# Patient Record
Sex: FEMALE | Race: WHITE | NOT HISPANIC OR LATINO | Employment: FULL TIME | URBAN - METROPOLITAN AREA
[De-identification: names, ages, dates, MRNs, and addresses within clinical notes are randomized per-mention and may not be internally consistent; named-entity substitution may affect disease eponyms.]

---

## 2017-03-07 ENCOUNTER — HOSPITAL ENCOUNTER (OUTPATIENT)
Dept: OTHER | Facility: HOSPITAL | Age: 32
Discharge: HOME | End: 2017-03-07
Attending: FAMILY MEDICINE | Admitting: FAMILY MEDICINE

## 2021-02-01 DIAGNOSIS — U07.1 COVID-19 VIRUS DETECTED: Primary | ICD-10-CM

## 2021-02-01 RX ORDER — AZITHROMYCIN 250 MG/1
TABLET, FILM COATED ORAL
Qty: 6 TABLET | Refills: 0 | Status: SHIPPED | OUTPATIENT
Start: 2021-02-01 | End: 2022-02-16

## 2021-02-01 RX ORDER — AZITHROMYCIN 250 MG/1
TABLET, FILM COATED ORAL
Qty: 6 TABLET | Refills: 0 | Status: CANCELLED | OUTPATIENT
Start: 2021-02-01

## 2021-02-22 NOTE — PROGRESS NOTES
Subjective   Albina Alvarado is a 35 y.o. female.     Chief Complaint   Patient presents with   • Back Pain       Albina reports of her back pain getting worse when she is about to start her menstrual period.    Back Pain  This is a chronic problem. The current episode started more than 1 year ago. The problem occurs constantly. The problem has been gradually worsening since onset. Pain location: lower left side of back  The quality of the pain is described as stabbing and aching. The pain radiates to the left foot. The pain is at a severity of 3/10. The pain is mild. The pain is the same all the time. Exacerbated by: walking. Stiffness is present: changes with position. Pertinent negatives include no abdominal pain, chest pain, fever, headaches, leg pain, numbness, pelvic pain, tingling or weakness. She has tried NSAIDs for the symptoms. The treatment provided mild relief.            I personally reviewed and updated the patient's allergies, medications, problem list, and past medical, surgical, social, and family history. I have reviewed and confirmed the accuracy of the History of Present Illness and Review of Symptoms as documented by the MA/LPN/RN. Lebron Child MD    History reviewed. No pertinent family history.    Social History     Tobacco Use   • Smoking status: Former Smoker     Packs/day: 0.25     Types: Cigarettes   • Smokeless tobacco: Never Used   • Tobacco comment: few months    Substance Use Topics   • Alcohol use: Yes     Frequency: 2-3 times a week     Drinks per session: 1 or 2     Binge frequency: Never   • Drug use: Never       No past surgical history on file.    Patient Active Problem List   Diagnosis   • Acute bilateral low back pain without sciatica   • Dysmenorrhea         Current Outpatient Medications:   •  azithromycin (ZITHROMAX) 250 MG tablet, Take 2 tablets the first day, then 1 tablet daily for 4 days., Disp: 6 tablet, Rfl: 0  •  busPIRone (BUSPAR) 7.5 MG tablet, Take 1 tablet by  "mouth 3 (Three) Times a Day As Needed (anxiety)., Disp: 60 tablet, Rfl: 5  •  cyclobenzaprine (FLEXERIL) 10 MG tablet, Take 0.5-1 tablets by mouth At Night As Needed for Muscle Spasms., Disp: 30 tablet, Rfl: 5  •  meloxicam (MOBIC) 15 MG tablet, Take 1 tablet by mouth Daily., Disp: 30 tablet, Rfl: 5         Review of Systems   Constitutional: Negative for chills, diaphoresis and fever.   Eyes: Negative for visual disturbance.   Respiratory: Negative for shortness of breath.    Cardiovascular: Negative for chest pain and palpitations.   Gastrointestinal: Negative for abdominal pain and nausea.   Endocrine: Negative for polydipsia and polyphagia.   Genitourinary: Negative for pelvic pain.   Musculoskeletal: Positive for back pain. Negative for neck stiffness.   Skin: Negative for color change and pallor.   Neurological: Negative for tingling, seizures, syncope, weakness and numbness.   Hematological: Negative for adenopathy.   Psychiatric/Behavioral: Negative for hallucinations and suicidal ideas.       I have reviewed and confirmed the accuracy of the ROS as documented by the MA/LPN/RN Lebron Child MD      Objective   /70 (BP Location: Right arm, Patient Position: Sitting, Cuff Size: Adult)   Pulse 93   Temp 97.5 °F (36.4 °C) (Temporal)   Resp 18   Ht 166.4 cm (65.5\")   Wt 62.6 kg (138 lb)   LMP 01/27/2021   SpO2 98% Comment: room air  BMI 22.62 kg/m²   BP Readings from Last 3 Encounters:   02/23/21 120/70   03/09/18 118/75   03/07/17 112/69     Wt Readings from Last 3 Encounters:   02/23/21 62.6 kg (138 lb)   03/09/18 65.2 kg (143 lb 12.8 oz)   03/07/17 62.3 kg (137 lb 4 oz)     Physical Exam  Constitutional:       Appearance: Normal appearance. She is well-developed. She is not diaphoretic.   Cardiovascular:      Rate and Rhythm: Normal rate and regular rhythm.      Pulses: Normal pulses.      Heart sounds: Normal heart sounds, S1 normal and S2 normal. No murmur. No friction rub. No gallop.  "   Pulmonary:      Effort: Pulmonary effort is normal. No accessory muscle usage.      Breath sounds: Normal breath sounds. No stridor. No decreased breath sounds, wheezing, rhonchi or rales.   Abdominal:      General: Bowel sounds are normal. There is no distension.      Palpations: Abdomen is soft. Abdomen is not rigid. There is no mass or pulsatile mass.      Tenderness: There is no abdominal tenderness. There is no guarding or rebound. Negative signs include Morris's sign.      Hernia: No hernia is present.   Musculoskeletal:      Right hip: She exhibits normal range of motion, normal strength, no tenderness, no swelling, no crepitus and no deformity.      Left hip: Normal. She exhibits normal range of motion, normal strength, no tenderness, no swelling, no crepitus and no deformity.      Thoracic back: Normal. She exhibits normal range of motion, no tenderness, no swelling, no edema, no deformity, no laceration and no spasm.      Lumbar back: She exhibits normal range of motion, no tenderness, no swelling, no edema, no deformity and no spasm.   Skin:     General: Skin is warm and dry.      Coloration: Skin is not pale.   Neurological:      Mental Status: She is alert and oriented to person, place, and time.      Sensory: No sensory deficit.      Motor: No atrophy or abnormal muscle tone.      Coordination: Coordination normal.      Gait: Gait normal.      Deep Tendon Reflexes: Reflexes are normal and symmetric.         Data / Lab Results:    No results found for: HGBA1C     No results found for: LDL, LDLDIRECT  No results found for: CHOL  No results found for: TRIG  No results found for: HDL  No results found for: PSA  No results found for: WBC, HGB, HCT, MCV, PLT  No results found for: TSH, V7AALCH, V8GXKON   No results found for: GLUCOSE, BUN, CREATININE, EGFRIFNONA, EGFRIFAFRI, BCR, K, CO2, CALCIUM, PROTENTOTREF, ALBUMIN, LABIL2, BILIRUBIN, AST, ALT  No results found for: PUMA, RF, SEDRATE   No results found  for: CRP   No results found for: IRON, TIBC, FERRITIN   No results found for: [id number]       Assessment/Plan      Medications        Problem List         LOS    Low back pain.  Longstanding with left lower extremity radiculopathy, likely secondary to lumbar disc disease.  Check x-ray/MRI.  Start meloxicam.  Rehabilitation exercises discussed.  Consider anesthesiology referral/epidural injections.  Symptoms worse with menses, DDx includes endometriosis, follow-up for pelvic exam.  Consider further eval if persistent symptoms.  Anxiety.  Worse recently add as needed buspirone.  Sleep improved on melatonin okay to increase dose.  Good social support.  Follow-up recheck.  Follow-up visit for comprehensive physical exam screening test scheduled.      Diagnoses and all orders for this visit:    1. Acute bilateral low back pain without sciatica (Primary)  -     XR Spine Lumbar Complete 4+VW; Future  -     meloxicam (MOBIC) 15 MG tablet; Take 1 tablet by mouth Daily.  Dispense: 30 tablet; Refill: 5  -     MRI Lumbar Spine Without Contrast; Future    2. Acute reaction to stress  -     busPIRone (BUSPAR) 7.5 MG tablet; Take 1 tablet by mouth 3 (Three) Times a Day As Needed (anxiety).  Dispense: 60 tablet; Refill: 5    3. Abnormal x-ray of lumbar spine  -     MRI Lumbar Spine Without Contrast; Future    4. Dysmenorrhea              Expected course, medications, and adverse effects discussed.  Call or return if worsening or persistent symptoms.  I wore protective equipment throughout this patient encounter including a mask, gloves, and eye protection.  Hand hygiene was performed before donning protective equipment and after removal when leaving the room. The complete contents of the Assessment and Plan and Data/Lab Results as documented above have been reviewed and addressed by myself with the patient today as part of an ongoing evaluation / treatment plan.  If some of the documentation has been copied from a previous note  and is unchanged it indicates that this problem / plan has been assessed today but is stable from a previous visit and no changes have been recommended.

## 2021-02-23 ENCOUNTER — HOSPITAL ENCOUNTER (OUTPATIENT)
Dept: GENERAL RADIOLOGY | Facility: HOSPITAL | Age: 36
Discharge: HOME | End: 2021-02-23
Admitting: FAMILY MEDICINE

## 2021-02-23 ENCOUNTER — TELEPHONE (OUTPATIENT)
Dept: FAMILY MEDICINE CLINIC | Facility: CLINIC | Age: 36
End: 2021-02-23

## 2021-02-23 ENCOUNTER — OFFICE VISIT (OUTPATIENT)
Dept: FAMILY MEDICINE CLINIC | Facility: CLINIC | Age: 36
End: 2021-02-23

## 2021-02-23 VITALS
HEIGHT: 66 IN | BODY MASS INDEX: 22.18 KG/M2 | RESPIRATION RATE: 18 BRPM | HEART RATE: 93 BPM | WEIGHT: 138 LBS | DIASTOLIC BLOOD PRESSURE: 70 MMHG | TEMPERATURE: 97.5 F | OXYGEN SATURATION: 98 % | SYSTOLIC BLOOD PRESSURE: 120 MMHG

## 2021-02-23 DIAGNOSIS — F43.0 ACUTE REACTION TO STRESS: ICD-10-CM

## 2021-02-23 DIAGNOSIS — M54.50 ACUTE BILATERAL LOW BACK PAIN WITHOUT SCIATICA: Primary | ICD-10-CM

## 2021-02-23 DIAGNOSIS — R93.7 ABNORMAL X-RAY OF LUMBAR SPINE: ICD-10-CM

## 2021-02-23 DIAGNOSIS — M54.50 ACUTE BILATERAL LOW BACK PAIN WITHOUT SCIATICA: ICD-10-CM

## 2021-02-23 DIAGNOSIS — N94.6 DYSMENORRHEA: ICD-10-CM

## 2021-02-23 PROCEDURE — 72110 X-RAY EXAM L-2 SPINE 4/>VWS: CPT

## 2021-02-23 PROCEDURE — 99214 OFFICE O/P EST MOD 30 MIN: CPT | Performed by: FAMILY MEDICINE

## 2021-02-23 RX ORDER — CYCLOBENZAPRINE HCL 10 MG
5-10 TABLET ORAL NIGHTLY PRN
Qty: 30 TABLET | Refills: 5 | Status: SHIPPED | OUTPATIENT
Start: 2021-02-23 | End: 2022-02-16

## 2021-02-23 RX ORDER — BUSPIRONE HYDROCHLORIDE 7.5 MG/1
7.5 TABLET ORAL 3 TIMES DAILY PRN
Qty: 60 TABLET | Refills: 5 | Status: SHIPPED | OUTPATIENT
Start: 2021-02-23 | End: 2022-03-15

## 2021-02-23 RX ORDER — MELOXICAM 15 MG/1
15 TABLET ORAL DAILY
Qty: 30 TABLET | Refills: 5 | Status: SHIPPED | OUTPATIENT
Start: 2021-02-23 | End: 2021-09-27

## 2021-02-23 NOTE — TELEPHONE ENCOUNTER
----- Message from Lebron Child MD sent at 2/23/2021 10:55 AM EST -----  Let her know she does have a significant narrowing in the vertebral space between the L5 and S1 vertebra in the moderate range as expected, she may have a bulging disc in that area, go ahead and set up for an MRI of her L-spine without contrast, thanks

## 2021-02-25 PROBLEM — N94.6 DYSMENORRHEA: Status: ACTIVE | Noted: 2021-02-25

## 2021-02-26 ENCOUNTER — TELEPHONE (OUTPATIENT)
Dept: FAMILY MEDICINE CLINIC | Facility: CLINIC | Age: 36
End: 2021-02-26

## 2021-02-26 DIAGNOSIS — M54.50 ACUTE BILATERAL LOW BACK PAIN WITHOUT SCIATICA: Primary | ICD-10-CM

## 2021-02-26 NOTE — TELEPHONE ENCOUNTER
Spoke with Albina,  Explained that medicaid did not approve MRI of back , they are requesting physical therapy , and follow appointment with Dr Child after completing. We have sent order to  physical therapy and they will call you and arrange the appointment.

## 2021-05-19 ENCOUNTER — TELEPHONE (OUTPATIENT)
Dept: FAMILY MEDICINE CLINIC | Facility: CLINIC | Age: 36
End: 2021-05-19

## 2021-05-19 NOTE — TELEPHONE ENCOUNTER
----- Message from Lebron Child MD sent at 5/19/2021  8:50 AM EDT -----  Let her know her blood work looks good, blood count, kidney, liver function, cholesterol, everything normal, thanks

## 2021-09-15 ENCOUNTER — TELEPHONE (OUTPATIENT)
Dept: FAMILY MEDICINE CLINIC | Facility: CLINIC | Age: 36
End: 2021-09-15

## 2021-09-15 NOTE — TELEPHONE ENCOUNTER
Caller: Jenny Albina    Relationship: Self    Best call back number: [phone]    What is the medical concern/diagnosis: PAIN IN RIGHT LOWER ABDOMINAL AREA.    What specialty or service is being requested: ULTRA SOUND OR SCAN OR AN APPOINTMENT    What is the provider, practice or medical service name: [facility]    What is the office location: OFFICE    Any additional details: PATIENT IS HAVING SEVERE LOWER ABDOMINAL PAIN ON HER RIGHT SIDE.  THIS HAS BEEN GOING ON FOR 4 DAYS NOW DURING HER MENSTRUAL CYCLE.

## 2021-09-26 DIAGNOSIS — M54.50 ACUTE BILATERAL LOW BACK PAIN WITHOUT SCIATICA: ICD-10-CM

## 2021-09-27 RX ORDER — MELOXICAM 15 MG/1
TABLET ORAL
Qty: 30 TABLET | Refills: 0 | Status: SHIPPED | OUTPATIENT
Start: 2021-09-27 | End: 2021-12-10

## 2021-12-09 DIAGNOSIS — M54.50 ACUTE BILATERAL LOW BACK PAIN WITHOUT SCIATICA: ICD-10-CM

## 2021-12-10 RX ORDER — MELOXICAM 15 MG/1
TABLET ORAL
Qty: 30 TABLET | Refills: 0 | Status: SHIPPED | OUTPATIENT
Start: 2021-12-10 | End: 2022-02-22

## 2022-01-03 ENCOUNTER — TELEPHONE (OUTPATIENT)
Dept: FAMILY MEDICINE CLINIC | Facility: CLINIC | Age: 37
End: 2022-01-03

## 2022-01-03 NOTE — TELEPHONE ENCOUNTER
Patient called with soa, fatigue, and a home postive on 1/3. I advised urgent care or ER due to SOA. She declined and asked I send a message for further instruction.

## 2022-01-03 NOTE — TELEPHONE ENCOUNTER
LM with patient that Dr Child is out of office this week. However I am happy to talk with her if she needs further instruction. If has chest pain or excessive SOB advised to go to nearest ER

## 2022-02-16 ENCOUNTER — OFFICE VISIT (OUTPATIENT)
Dept: FAMILY MEDICINE CLINIC | Facility: CLINIC | Age: 37
End: 2022-02-16

## 2022-02-16 VITALS
HEIGHT: 66 IN | HEART RATE: 63 BPM | OXYGEN SATURATION: 98 % | RESPIRATION RATE: 16 BRPM | WEIGHT: 132.8 LBS | DIASTOLIC BLOOD PRESSURE: 60 MMHG | TEMPERATURE: 97.3 F | SYSTOLIC BLOOD PRESSURE: 102 MMHG | BODY MASS INDEX: 21.34 KG/M2

## 2022-02-16 DIAGNOSIS — F43.0 ACUTE REACTION TO STRESS: Primary | ICD-10-CM

## 2022-02-16 DIAGNOSIS — R06.2 WHEEZING: ICD-10-CM

## 2022-02-16 DIAGNOSIS — Z87.891 HISTORY OF TOBACCO USE: ICD-10-CM

## 2022-02-16 DIAGNOSIS — R05.9 COUGH: ICD-10-CM

## 2022-02-16 PROCEDURE — 99214 OFFICE O/P EST MOD 30 MIN: CPT | Performed by: FAMILY MEDICINE

## 2022-02-16 RX ORDER — ESCITALOPRAM OXALATE 5 MG/1
5 TABLET ORAL DAILY
Qty: 30 TABLET | Refills: 2 | Status: SHIPPED | OUTPATIENT
Start: 2022-02-16 | End: 2022-05-18

## 2022-02-16 RX ORDER — AZITHROMYCIN 250 MG/1
TABLET, FILM COATED ORAL
Qty: 6 TABLET | Refills: 0 | Status: SHIPPED | OUTPATIENT
Start: 2022-02-16 | End: 2022-03-15

## 2022-02-16 NOTE — PROGRESS NOTES
Subjective   Albina Alvarado is a 36 y.o. female.     Chief Complaint   Patient presents with   • Anxiety   • Wheezing       Albina says the buspirone has helped some. She is taking half a tablet on the day she feels she needs it but has noticed she feels fatigued the next day. She states she can not take a whole tablet or she feels nauseas.    Albina also states since having covid-19 the beginning of 2022 she still has shortness of breath and wheezing. She will feel at times like her throat is stiff or tense and she wheezes. Her  currently has covid-19. She does have a headache but said she should not have it since she just had covid 4 weeks ago.    Anxiety  Presents for follow-up visit. Symptoms include decreased concentration, excessive worry, irritability, malaise, nervous/anxious behavior and obsessions. Patient reports no chest pain, nausea, palpitations, shortness of breath or suicidal ideas. The severity of symptoms is moderate. The patient sleeps 7 hours per night. Nighttime awakenings: several.                I personally reviewed and updated the patient's allergies, medications, problem list, and past medical, surgical, social, and family history. I have reviewed and confirmed the accuracy of the History of Present Illness and Review of Symptoms as documented by the MA/LPN/RN. Lebron Child MD    History reviewed. No pertinent family history.    Social History     Tobacco Use   • Smoking status: Former Smoker     Packs/day: 0.25     Years: 1.00     Pack years: 0.25     Types: Cigarettes     Start date: 2004     Quit date: 2005     Years since quittin.1   • Smokeless tobacco: Never Used   Vaping Use   • Vaping Use: Never used   Substance Use Topics   • Alcohol use: Yes   • Drug use: Never       History reviewed. No pertinent surgical history.    Patient Active Problem List   Diagnosis   • Acute bilateral low back pain without sciatica   • Dysmenorrhea   • History of tobacco use  "  • COVID-19 virus detected         Current Outpatient Medications:   •  busPIRone (BUSPAR) 7.5 MG tablet, Take 1 tablet by mouth 3 (Three) Times a Day As Needed (anxiety)., Disp: 60 tablet, Rfl: 5  •  meloxicam (MOBIC) 15 MG tablet, Take 1 tablet by mouth once daily, Disp: 30 tablet, Rfl: 0  •  azithromycin (ZITHROMAX) 250 MG tablet, Take 2 tablets the first day, then 1 tablet daily for 4 days., Disp: 6 tablet, Rfl: 0  •  escitalopram (Lexapro) 5 MG tablet, Take 1 tablet by mouth Daily., Disp: 30 tablet, Rfl: 2         Review of Systems   Constitutional: Positive for irritability. Negative for chills and diaphoresis.   Eyes: Negative for visual disturbance.   Respiratory: Negative for shortness of breath.    Cardiovascular: Negative for chest pain and palpitations.   Gastrointestinal: Negative for abdominal pain and nausea.   Endocrine: Negative for polydipsia and polyphagia.   Musculoskeletal: Negative for neck stiffness.   Skin: Negative for color change and pallor.   Neurological: Negative for seizures and syncope.   Hematological: Negative for adenopathy.   Psychiatric/Behavioral: Positive for decreased concentration. Negative for hallucinations and suicidal ideas. The patient is nervous/anxious.        I have reviewed and confirmed the accuracy of the ROS as documented by the MA/LPN/RN Lebron Child MD      Objective   /60   Pulse 63   Temp 97.3 °F (36.3 °C)   Resp 16   Ht 166.4 cm (65.5\")   Wt 60.2 kg (132 lb 12.8 oz)   LMP 02/02/2022   SpO2 98%   Breastfeeding No   BMI 21.76 kg/m²   BP Readings from Last 3 Encounters:   02/16/22 102/60   02/23/21 120/70   03/09/18 118/75     Wt Readings from Last 3 Encounters:   02/16/22 60.2 kg (132 lb 12.8 oz)   02/23/21 62.6 kg (138 lb)   03/09/18 65.2 kg (143 lb 12.8 oz)     Physical Exam  Constitutional:       Appearance: Normal appearance. She is well-developed. She is not diaphoretic.   Cardiovascular:      Rate and Rhythm: Normal rate and regular " rhythm.      Pulses: Normal pulses.      Heart sounds: Normal heart sounds, S1 normal and S2 normal. No murmur heard.  No friction rub. No gallop.    Pulmonary:      Effort: Pulmonary effort is normal. No accessory muscle usage.      Breath sounds: Normal breath sounds. No stridor. No decreased breath sounds, wheezing, rhonchi or rales.   Abdominal:      General: Bowel sounds are normal. There is no distension.      Palpations: Abdomen is soft. Abdomen is not rigid. There is no mass or pulsatile mass.      Tenderness: There is no abdominal tenderness. There is no guarding or rebound. Negative signs include Morris's sign.      Hernia: No hernia is present.   Skin:     General: Skin is warm and dry.      Coloration: Skin is not pale.   Neurological:      Mental Status: She is alert and oriented to person, place, and time.      Coordination: Coordination normal.      Gait: Gait normal.         Data / Lab Results:    No results found for: HGBA1C     Lab Results   Component Value Date    LDL 98 05/18/2021     No results found for: CHOL  Lab Results   Component Value Date    TRIG 39 05/18/2021     Lab Results   Component Value Date    HDL 63 05/18/2021     No results found for: PSA  Lab Results   Component Value Date    WBC 5.5 05/18/2021    HGB 12.6 05/18/2021    HCT 37.3 05/18/2021    MCV 92 05/18/2021     05/18/2021     Lab Results   Component Value Date    TSH 0.690 05/18/2021      Lab Results   Component Value Date    GLUCOSE 86 05/18/2021    BUN 10 05/18/2021    CREATININE 0.66 05/18/2021    EGFRIFNONA 116 05/18/2021    EGFRIFAFRI 133 05/18/2021    BCR 15 05/18/2021    K 4.2 05/18/2021    CO2 24 05/18/2021    CALCIUM 9.4 05/18/2021    PROTENTOTREF 6.9 05/18/2021    ALBUMIN 4.4 05/18/2021    LABIL2 1.8 05/18/2021    AST 18 05/18/2021    ALT 13 05/18/2021     No results found for: PUMA, RF, SEDRATE   No results found for: CRP   No results found for: IRON, TIBC, FERRITIN   No results found for: [id number]        Assessment/Plan      Medications        Problem List         LOS      Low back pain.  Longstanding with left lower extremity radiculopathy, likely secondary to lumbar disc disease.  Check x-ray/MRI.  Start meloxicam.  Rehabilitation exercises discussed.  Consider anesthesiology referral/epidural injections.  Symptoms worse with menses, DDx includes endometriosis, follow-up for pelvic exam.  Consider further eval if persistent symptoms.  Anxiety.   About the same today, not tolerating buspirone, start Lexapro. Longstanding. Sleep improved on melatonin okay to increase dose.  Good social support.  Follow-up recheck.  COVID-19 viral upper respiratory infection. Overall clinically improved today. Persistent sinus effusion, lymphadenopathy start Z-Bill. Has been vaccinated. Call return if respiratory difficulty worsening symptoms. Has completed quarantine.  Follow-up visit for comprehensive physical exam screening test planned.        Diagnoses and all orders for this visit:    1. Acute reaction to stress (Primary)  -     escitalopram (Lexapro) 5 MG tablet; Take 1 tablet by mouth Daily.  Dispense: 30 tablet; Refill: 2    2. Wheezing    3. History of tobacco use    4. Cough  -     azithromycin (ZITHROMAX) 250 MG tablet; Take 2 tablets the first day, then 1 tablet daily for 4 days.  Dispense: 6 tablet; Refill: 0              Expected course, medications, and adverse effects discussed.  Call or return if worsening or persistent symptoms.  I wore protective equipment throughout this patient encounter including a mask, gloves, and eye protection.  Hand hygiene was performed before donning protective equipment and after removal when leaving the room. The complete contents of the Assessment and Plan and Data/Lab Results as documented above have been reviewed and addressed by myself with the patient today as part of an ongoing evaluation / treatment plan.  If some of the documentation has been copied from a previous note  and is unchanged it indicates that this problem / plan has been assessed today but is stable from a previous visit and no changes have been recommended.

## 2022-02-17 PROBLEM — U07.1 COVID-19 VIRUS DETECTED: Status: ACTIVE | Noted: 2022-02-17

## 2022-02-22 DIAGNOSIS — M54.50 ACUTE BILATERAL LOW BACK PAIN WITHOUT SCIATICA: ICD-10-CM

## 2022-02-22 RX ORDER — MELOXICAM 15 MG/1
TABLET ORAL
Qty: 30 TABLET | Refills: 0 | Status: SHIPPED | OUTPATIENT
Start: 2022-02-22 | End: 2022-03-14

## 2022-03-14 DIAGNOSIS — M54.50 ACUTE BILATERAL LOW BACK PAIN WITHOUT SCIATICA: ICD-10-CM

## 2022-03-14 RX ORDER — MELOXICAM 15 MG/1
TABLET ORAL
Qty: 30 TABLET | Refills: 0 | Status: SHIPPED | OUTPATIENT
Start: 2022-03-14 | End: 2022-03-15 | Stop reason: SDUPTHER

## 2022-03-15 ENCOUNTER — HOSPITAL ENCOUNTER (OUTPATIENT)
Dept: GENERAL RADIOLOGY | Facility: HOSPITAL | Age: 37
Discharge: HOME | End: 2022-03-15
Admitting: FAMILY MEDICINE

## 2022-03-15 ENCOUNTER — OFFICE VISIT (OUTPATIENT)
Dept: FAMILY MEDICINE CLINIC | Facility: CLINIC | Age: 37
End: 2022-03-15

## 2022-03-15 VITALS
RESPIRATION RATE: 18 BRPM | HEART RATE: 76 BPM | SYSTOLIC BLOOD PRESSURE: 112 MMHG | BODY MASS INDEX: 21.05 KG/M2 | WEIGHT: 131 LBS | DIASTOLIC BLOOD PRESSURE: 80 MMHG | OXYGEN SATURATION: 99 % | TEMPERATURE: 97.1 F | HEIGHT: 66 IN

## 2022-03-15 DIAGNOSIS — Z87.891 HISTORY OF TOBACCO USE: ICD-10-CM

## 2022-03-15 DIAGNOSIS — F41.9 ANXIETY: Primary | ICD-10-CM

## 2022-03-15 DIAGNOSIS — R05.9 COUGH: ICD-10-CM

## 2022-03-15 DIAGNOSIS — U07.1 COVID-19 VIRUS DETECTED: ICD-10-CM

## 2022-03-15 DIAGNOSIS — M54.50 ACUTE BILATERAL LOW BACK PAIN WITHOUT SCIATICA: ICD-10-CM

## 2022-03-15 DIAGNOSIS — R06.02 SHORTNESS OF BREATH: ICD-10-CM

## 2022-03-15 PROCEDURE — 71046 X-RAY EXAM CHEST 2 VIEWS: CPT

## 2022-03-15 PROCEDURE — 99214 OFFICE O/P EST MOD 30 MIN: CPT | Performed by: FAMILY MEDICINE

## 2022-03-15 RX ORDER — MELOXICAM 15 MG/1
15 TABLET ORAL DAILY
Qty: 90 TABLET | Refills: 3 | Status: SHIPPED | OUTPATIENT
Start: 2022-03-15

## 2022-03-15 RX ORDER — PREDNISONE 1 MG/1
TABLET ORAL
Qty: 45 TABLET | Refills: 0 | Status: SHIPPED | OUTPATIENT
Start: 2022-03-15 | End: 2022-05-17

## 2022-03-15 RX ORDER — ALBUTEROL SULFATE 90 UG/1
2 AEROSOL, METERED RESPIRATORY (INHALATION) EVERY 4 HOURS PRN
Qty: 18 G | Refills: 2 | Status: SHIPPED | OUTPATIENT
Start: 2022-03-15 | End: 2023-01-13 | Stop reason: SDUPTHER

## 2022-03-15 NOTE — PROGRESS NOTES
Subjective   Albina Alvarado is a 36 y.o. female.     Chief Complaint   Patient presents with   • Anxiety   • Shortness of Breath       Albina was started on Lexapro on 22    Anxiety  Presents for follow-up visit. Symptoms include decreased concentration, excessive worry, irritability, malaise, nervous/anxious behavior, obsessions and shortness of breath. Patient reports no chest pain, nausea, palpitations or suicidal ideas. The severity of symptoms is moderate. The patient sleeps 7 hours per night. Nighttime awakenings: several.       Shortness of Breath  This is a recurrent problem. The current episode started more than 1 month ago. Pertinent negatives include no abdominal pain or chest pain. The patient has no known risk factors (has IUD ) for DVT/PE.            I personally reviewed and updated the patient's allergies, medications, problem list, and past medical, surgical, social, and family history. I have reviewed and confirmed the accuracy of the History of Present Illness and Review of Symptoms as documented by the MA/LPN/RN. Lebron Child MD    History reviewed. No pertinent family history.    Social History     Tobacco Use   • Smoking status: Former Smoker     Packs/day: 0.25     Years: 1.00     Pack years: 0.25     Types: Cigarettes     Start date: 2004     Quit date: 2005     Years since quittin.2   • Smokeless tobacco: Never Used   Vaping Use   • Vaping Use: Never used   Substance Use Topics   • Alcohol use: Yes   • Drug use: Never       No past surgical history on file.    Patient Active Problem List   Diagnosis   • Acute bilateral low back pain without sciatica   • Dysmenorrhea   • History of tobacco use   • COVID-19 virus detected   • Anxiety         Current Outpatient Medications:   •  escitalopram (Lexapro) 5 MG tablet, Take 1 tablet by mouth Daily., Disp: 30 tablet, Rfl: 2  •  levonorgestrel (MIRENA) 20 MCG/24HR IUD, 1 each by Intrauterine route 1 (One) Time., Disp: , Rfl:   •   "meloxicam (MOBIC) 15 MG tablet, Take 1 tablet by mouth Daily., Disp: 90 tablet, Rfl: 3  •  albuterol sulfate  (90 Base) MCG/ACT inhaler, Inhale 2 puffs Every 4 (Four) Hours As Needed for Wheezing., Disp: 18 g, Rfl: 2  •  predniSONE (DELTASONE) 5 MG tablet, 40mg x 3 days, 20mg x 3 days, 10mg x 3 days, 5mg x 3 days, Disp: 45 tablet, Rfl: 0         Review of Systems   Constitutional: Positive for irritability. Negative for chills and diaphoresis.   Eyes: Negative for visual disturbance.   Respiratory: Positive for shortness of breath.    Cardiovascular: Negative for chest pain and palpitations.   Gastrointestinal: Negative for abdominal pain and nausea.   Endocrine: Negative for polydipsia and polyphagia.   Musculoskeletal: Negative for neck stiffness.   Skin: Negative for color change and pallor.   Neurological: Negative for seizures and syncope.   Hematological: Negative for adenopathy.   Psychiatric/Behavioral: Positive for decreased concentration. Negative for hallucinations and suicidal ideas. The patient is nervous/anxious.        I have reviewed and confirmed the accuracy of the ROS as documented by the MA/LPN/RN Lebron Child MD      Objective   /80   Pulse 76   Temp 97.1 °F (36.2 °C)   Resp 18   Ht 166.4 cm (65.5\")   Wt 59.4 kg (131 lb)   SpO2 99%   Breastfeeding No   BMI 21.47 kg/m²   BP Readings from Last 3 Encounters:   03/15/22 112/80   02/16/22 102/60   02/23/21 120/70     Wt Readings from Last 3 Encounters:   03/15/22 59.4 kg (131 lb)   02/16/22 60.2 kg (132 lb 12.8 oz)   02/23/21 62.6 kg (138 lb)     Physical Exam  Constitutional:       Appearance: Normal appearance. She is well-developed. She is not diaphoretic.   HENT:      Head: Normocephalic.      Right Ear: Hearing, ear canal and external ear normal. A middle ear effusion is present. Tympanic membrane is erythematous.      Left Ear: Hearing, ear canal and external ear normal. A middle ear effusion is present. Tympanic membrane " is erythematous.      Nose: Congestion present.      Right Sinus: Maxillary sinus tenderness and frontal sinus tenderness present.      Left Sinus: Maxillary sinus tenderness and frontal sinus tenderness present.      Mouth/Throat:      Pharynx: Posterior oropharyngeal erythema present.      Tonsils: No tonsillar abscesses. 1+ on the right. 1+ on the left.   Eyes:      General: Lids are normal.      Conjunctiva/sclera: Conjunctivae normal.      Pupils: Pupils are equal, round, and reactive to light.   Neck:      Meningeal: Brudzinski's sign and Kernig's sign absent.   Cardiovascular:      Rate and Rhythm: Normal rate and regular rhythm.      Pulses: Normal pulses.      Heart sounds: Normal heart sounds, S1 normal and S2 normal. No murmur heard.    No friction rub. No gallop.   Pulmonary:      Effort: Pulmonary effort is normal. No accessory muscle usage or respiratory distress.      Breath sounds: No stridor. Examination of the right-upper field reveals wheezing and rhonchi. Examination of the left-upper field reveals wheezing and rhonchi. Examination of the right-middle field reveals wheezing and rhonchi. Examination of the left-middle field reveals wheezing and rhonchi. Examination of the right-lower field reveals wheezing and rhonchi. Examination of the left-lower field reveals wheezing and rhonchi. Wheezing and rhonchi present. No decreased breath sounds or rales.   Abdominal:      General: Bowel sounds are normal. There is no distension.      Palpations: Abdomen is soft. There is no mass.      Tenderness: There is no abdominal tenderness.      Hernia: No hernia is present.   Skin:     General: Skin is warm and dry.      Coloration: Skin is not pale.   Neurological:      Mental Status: She is alert and oriented to person, place, and time.      Cranial Nerves: No cranial nerve deficit.      Coordination: Coordination normal.      Gait: Gait normal.         Data / Lab Results:    No results found for: HGBA1C      Lab Results   Component Value Date    LDL 98 05/18/2021     No results found for: CHOL  Lab Results   Component Value Date    TRIG 39 05/18/2021     Lab Results   Component Value Date    HDL 63 05/18/2021     No results found for: PSA  Lab Results   Component Value Date    WBC 5.5 05/18/2021    HGB 12.6 05/18/2021    HCT 37.3 05/18/2021    MCV 92 05/18/2021     05/18/2021     Lab Results   Component Value Date    TSH 0.690 05/18/2021      Lab Results   Component Value Date    GLUCOSE 86 05/18/2021    BUN 10 05/18/2021    CREATININE 0.66 05/18/2021    EGFRIFNONA 116 05/18/2021    EGFRIFAFRI 133 05/18/2021    BCR 15 05/18/2021    K 4.2 05/18/2021    CO2 24 05/18/2021    CALCIUM 9.4 05/18/2021    PROTENTOTREF 6.9 05/18/2021    ALBUMIN 4.4 05/18/2021    LABIL2 1.8 05/18/2021    AST 18 05/18/2021    ALT 13 05/18/2021     No results found for: PUMA, RF, SEDRATE   No results found for: CRP   No results found for: IRON, TIBC, FERRITIN   No results found for: [id number]       Assessment/Plan      Medications        Problem List         LOS      Low back pain.  Longstanding with left lower extremity radiculopathy, likely secondary to lumbar disc disease.  Check x-ray/MRI.  Start meloxicam.  Rehabilitation exercises discussed.  Consider anesthesiology referral/epidural injections.  Symptoms worse with menses, DDx includes endometriosis, follow-up for pelvic exam.  Consider further eval if persistent symptoms.  Anxiety.   Much improved today on Lexapro 5 mg daily.. Longstanding. Sleep improved on melatonin okay to increase dose.  Good social support.  Follow-up recheck.  COVID-19 viral upper respiratory infection. Overall clinically improved today.  Persistent wheezing/bronchoconstriction today, chest x-ray benign today, has completed course of Z-Bill, start prednisone/rescue inhaler.. Has been vaccinated. Call return if respiratory difficulty worsening symptoms. Has completed quarantine.  Follow-up visit for  comprehensive physical exam screening test  scheduled.        Diagnoses and all orders for this visit:    1. Anxiety (Primary)    2. History of tobacco use    3. Shortness of breath  -     XR Chest PA & Lateral  -     predniSONE (DELTASONE) 5 MG tablet; 40mg x 3 days, 20mg x 3 days, 10mg x 3 days, 5mg x 3 days  Dispense: 45 tablet; Refill: 0  -     albuterol sulfate  (90 Base) MCG/ACT inhaler; Inhale 2 puffs Every 4 (Four) Hours As Needed for Wheezing.  Dispense: 18 g; Refill: 2    4. Cough  -     XR Chest PA & Lateral  -     predniSONE (DELTASONE) 5 MG tablet; 40mg x 3 days, 20mg x 3 days, 10mg x 3 days, 5mg x 3 days  Dispense: 45 tablet; Refill: 0  -     albuterol sulfate  (90 Base) MCG/ACT inhaler; Inhale 2 puffs Every 4 (Four) Hours As Needed for Wheezing.  Dispense: 18 g; Refill: 2    5. Acute bilateral low back pain without sciatica  -     meloxicam (MOBIC) 15 MG tablet; Take 1 tablet by mouth Daily.  Dispense: 90 tablet; Refill: 3    6. COVID-19 virus detected              Expected course, medications, and adverse effects discussed.  Call or return if worsening or persistent symptoms.  I wore protective equipment throughout this patient encounter including a mask, gloves, and eye protection.  Hand hygiene was performed before donning protective equipment and after removal when leaving the room. The complete contents of the Assessment and Plan and Data/Lab Results as documented above have been reviewed and addressed by myself with the patient today as part of an ongoing evaluation / treatment plan.  If some of the documentation has been copied from a previous note and is unchanged it indicates that this problem / plan has been assessed today but is stable from a previous visit and no changes have been recommended.

## 2022-03-18 ENCOUNTER — TELEPHONE (OUTPATIENT)
Dept: FAMILY MEDICINE CLINIC | Facility: CLINIC | Age: 37
End: 2022-03-18

## 2022-05-16 NOTE — PROGRESS NOTES
Subjective   Albina Alvarado is a 36 y.o. female.     Chief Complaint   Patient presents with   • Annual Exam   • Anxiety       The patient is here: for coordination of medical care to discuss health maintenance and disease prevention to follow up on multiple medical problems.  Last comprehensive physical was on 3/26/2016.  Previous physical was performed by  Lebron Child MD  Overall has: moderate activity with work/home activities, good appetite, feels well with minor complaints, good energy level and is sleeping well. Nutrition: eating a variety of foods. Last tetanus shot was 2014. Patient is doing routine self breast exams: monthly Albina reports that her last pap was 2021 at [facility]     History of Present Illness     Recent Hospitalizations:  No hospitalization(s) within the last year..  ccc      I personally reviewed and updated the patient's allergies, medications, problem list, and past medical, surgical, social, and family history. I have reviewed and confirmed the accuracy of the HPI and ROS as documented by the MA/LPN/RN Lebron Child MD    Family History   Problem Relation Age of Onset   • Hypertension Mother    • COPD Mother    • Ulcerative colitis Mother    • Atrial fibrillation Father    • No Known Problems Brother        Social History     Tobacco Use   • Smoking status: Former Smoker     Packs/day: 0.25     Years: 1.00     Pack years: 0.25     Types: Cigarettes     Start date: 2004     Quit date: 2005     Years since quittin.3   • Smokeless tobacco: Never Used   Vaping Use   • Vaping Use: Never used   Substance Use Topics   • Alcohol use: Yes   • Drug use: Never       Past Surgical History:   Procedure Laterality Date   • BREAST AUGMENTATION Bilateral        Patient Active Problem List   Diagnosis   • Acute bilateral low back pain without sciatica   • Dysmenorrhea   • History of tobacco use   • COVID-19 virus detected   • Anxiety   • Annual visit for  "general adult medical examination with abnormal findings   • Screening for malignant neoplasm of cervix         Current Outpatient Medications:   •  albuterol sulfate  (90 Base) MCG/ACT inhaler, Inhale 2 puffs Every 4 (Four) Hours As Needed for Wheezing., Disp: 18 g, Rfl: 2  •  escitalopram (Lexapro) 5 MG tablet, Take 1 tablet by mouth Daily., Disp: 30 tablet, Rfl: 2  •  levonorgestrel (MIRENA) 20 MCG/24HR IUD, 1 each by Intrauterine route 1 (One) Time., Disp: , Rfl:   •  meloxicam (MOBIC) 15 MG tablet, Take 1 tablet by mouth Daily., Disp: 90 tablet, Rfl: 3    Review of Systems   Constitutional: Negative for chills and diaphoresis.   HENT: Negative for trouble swallowing and voice change.    Eyes: Negative for visual disturbance.   Respiratory: Negative for shortness of breath.    Cardiovascular: Negative for chest pain and palpitations.   Gastrointestinal: Negative for abdominal pain and nausea.   Endocrine: Negative for polydipsia and polyphagia.   Genitourinary: Negative for hematuria.   Musculoskeletal: Negative for neck stiffness.   Skin: Negative for color change and pallor.   Allergic/Immunologic: Negative for immunocompromised state.   Neurological: Negative for seizures and syncope.   Hematological: Negative for adenopathy.   Psychiatric/Behavioral: Negative for hallucinations, sleep disturbance and suicidal ideas.       I have reviewed and confirmed the accuracy of the ROS as documented by the MA/LPN/RN Lebron Child MD      Objective   /84   Pulse 73   Temp 98.2 °F (36.8 °C)   Resp 18   Ht 166.4 cm (65.5\")   Wt 65 kg (143 lb 3.2 oz)   LMP 05/15/2022 (Exact Date)   SpO2 99%   Breastfeeding No   BMI 23.47 kg/m²   BP Readings from Last 3 Encounters:   05/17/22 150/84   03/15/22 112/80   02/16/22 102/60     Wt Readings from Last 3 Encounters:   05/17/22 65 kg (143 lb 3.2 oz)   03/15/22 59.4 kg (131 lb)   02/16/22 60.2 kg (132 lb 12.8 oz)     Physical Exam  Constitutional:       " Appearance: Normal appearance. She is well-developed. She is not diaphoretic.   Cardiovascular:      Rate and Rhythm: Normal rate and regular rhythm.      Pulses: Normal pulses.      Heart sounds: Normal heart sounds, S1 normal and S2 normal. No murmur heard.    No friction rub. No gallop.   Pulmonary:      Effort: Pulmonary effort is normal. No accessory muscle usage.      Breath sounds: Normal breath sounds. No stridor. No decreased breath sounds, wheezing, rhonchi or rales.   Abdominal:      General: Bowel sounds are normal. There is no distension.      Palpations: Abdomen is soft. Abdomen is not rigid. There is no mass or pulsatile mass.      Tenderness: There is no abdominal tenderness. There is no guarding or rebound. Negative signs include Morris's sign.      Hernia: No hernia is present.   Skin:     General: Skin is warm and dry.      Coloration: Skin is not pale.   Neurological:      Mental Status: She is alert and oriented to person, place, and time.      Coordination: Coordination normal.      Gait: Gait normal.         Data / Lab Results:    No results found for: HGBA1C     Lab Results   Component Value Date    LDL 98 05/18/2021     No results found for: CHOL  Lab Results   Component Value Date    TRIG 39 05/18/2021     Lab Results   Component Value Date    HDL 63 05/18/2021     No results found for: PSA  Lab Results   Component Value Date    WBC 5.5 05/18/2021    HGB 12.6 05/18/2021    HCT 37.3 05/18/2021    MCV 92 05/18/2021     05/18/2021     Lab Results   Component Value Date    TSH 0.690 05/18/2021      Lab Results   Component Value Date    GLUCOSE 86 05/18/2021    BUN 10 05/18/2021    CREATININE 0.66 05/18/2021    EGFRIFNONA 116 05/18/2021    EGFRIFAFRI 133 05/18/2021    BCR 15 05/18/2021    K 4.2 05/18/2021    CO2 24 05/18/2021    CALCIUM 9.4 05/18/2021    PROTENTOTREF 6.9 05/18/2021    ALBUMIN 4.4 05/18/2021    LABIL2 1.8 05/18/2021    AST 18 05/18/2021    ALT 13 05/18/2021     No results  found for: PUMA, RF, SEDRATE   No results found for: CRP   No results found for: IRON, TIBC, FERRITIN   No results found for: [id number]     Age-appropriate Screening Schedule:  Refer to the list below for future screening recommendations based on patient's age, sex and/or medical conditions. Orders for these recommended tests are listed in the plan section. The patient has been provided with a written plan.    Health Maintenance   Topic Date Due   • INFLUENZA VACCINE  08/01/2022   • TDAP/TD VACCINES (3 - Td or Tdap) 06/23/2024   • PAP SMEAR  11/01/2024           Assessment & Plan      Medications        Problem List         LOS    Physical.  Doing well, vaccines current.  Discussed calcium and vitamin D.  Discussed baby aspirin daily.  Pap current/followed by OB/GYN.  Discussed health maintenance, screening  Risk modification.  Lumbar disc disease.  Moderate narrowing L5-S1 per x-ray 2021.  Improved currently on meloxicam.  Rehabilitation exercise discussed.  Consider MRI if worsening symptoms.  Anxiety.   Much improved today on Lexapro 5 mg daily.. Longstanding. Sleep improved on as needed melatonin.  Good social support.  Follow-up recheck.  COVID-19 viral upper respiratory infection.  Improved/resolved currently has completed course antibiotics/prednisone.  Has been vaccinated. Call return if respiratory difficulty worsening symptoms. Has completed quarantine.        Diagnoses and all orders for this visit:    1. Annual visit for general adult medical examination with abnormal findings (Primary)  -     POCT urinalysis dipstick, manual    2. Anxiety    3. History of tobacco use    4. Screening for malignant neoplasm of cervix    5. Malaise and fatigue    6. Screening for lipid disorders              Expected course, medications, and adverse effects discussed.  Call or return if worsening or persistent symptoms.  I wore protective equipment throughout this patient encounter including a mask, gloves, and eye  protection.  Hand hygiene was performed before donning protective equipment and after removal when leaving the room. The complete contents of the Assessment and Plan and Data / Lab Results as documented above have been reviewed and addressed by myself with the patient today as part of an ongoing evaluation / treatment plan.  If some of the documentation has been copied from a previous note and is unchanged it indicates that this problem / plan has been assessed today but is stable from a previous visit and no changes have been recommended.

## 2022-05-17 ENCOUNTER — OFFICE VISIT (OUTPATIENT)
Dept: FAMILY MEDICINE CLINIC | Facility: CLINIC | Age: 37
End: 2022-05-17

## 2022-05-17 VITALS
DIASTOLIC BLOOD PRESSURE: 84 MMHG | BODY MASS INDEX: 23.01 KG/M2 | OXYGEN SATURATION: 99 % | HEIGHT: 66 IN | SYSTOLIC BLOOD PRESSURE: 150 MMHG | RESPIRATION RATE: 18 BRPM | HEART RATE: 73 BPM | TEMPERATURE: 98.2 F | WEIGHT: 143.2 LBS

## 2022-05-17 DIAGNOSIS — Z00.01 ANNUAL VISIT FOR GENERAL ADULT MEDICAL EXAMINATION WITH ABNORMAL FINDINGS: Primary | ICD-10-CM

## 2022-05-17 DIAGNOSIS — R53.81 MALAISE AND FATIGUE: ICD-10-CM

## 2022-05-17 DIAGNOSIS — Z87.891 HISTORY OF TOBACCO USE: ICD-10-CM

## 2022-05-17 DIAGNOSIS — R53.83 MALAISE AND FATIGUE: ICD-10-CM

## 2022-05-17 DIAGNOSIS — F43.0 ACUTE REACTION TO STRESS: ICD-10-CM

## 2022-05-17 DIAGNOSIS — Z13.220 SCREENING FOR LIPID DISORDERS: ICD-10-CM

## 2022-05-17 DIAGNOSIS — F41.9 ANXIETY: ICD-10-CM

## 2022-05-17 DIAGNOSIS — Z12.4 SCREENING FOR MALIGNANT NEOPLASM OF CERVIX: ICD-10-CM

## 2022-05-17 LAB
BILIRUB BLD-MCNC: NEGATIVE MG/DL
CLARITY, POC: CLEAR
COLOR UR: YELLOW
GLUCOSE UR STRIP-MCNC: NEGATIVE MG/DL
KETONES UR QL: NEGATIVE
LEUKOCYTE EST, POC: NEGATIVE
NITRITE UR-MCNC: NEGATIVE MG/ML
PH UR: 7 [PH] (ref 5–8)
PROT UR STRIP-MCNC: NEGATIVE MG/DL
RBC # UR STRIP: ABNORMAL /UL
SP GR UR: 1.01 (ref 1–1.03)
UROBILINOGEN UR QL: NORMAL

## 2022-05-17 PROCEDURE — 99395 PREV VISIT EST AGE 18-39: CPT | Performed by: FAMILY MEDICINE

## 2022-05-17 PROCEDURE — 81002 URINALYSIS NONAUTO W/O SCOPE: CPT | Performed by: FAMILY MEDICINE

## 2022-05-18 RX ORDER — ESCITALOPRAM OXALATE 5 MG/1
TABLET ORAL
Qty: 30 TABLET | Refills: 2 | Status: SHIPPED | OUTPATIENT
Start: 2022-05-18 | End: 2022-08-08

## 2022-08-08 DIAGNOSIS — F43.0 ACUTE REACTION TO STRESS: ICD-10-CM

## 2022-08-08 RX ORDER — ESCITALOPRAM OXALATE 5 MG/1
TABLET ORAL
Qty: 30 TABLET | Refills: 2 | Status: SHIPPED | OUTPATIENT
Start: 2022-08-08 | End: 2022-11-14

## 2022-10-19 ENCOUNTER — TELEPHONE (OUTPATIENT)
Dept: FAMILY MEDICINE CLINIC | Facility: CLINIC | Age: 37
End: 2022-10-19

## 2022-10-19 NOTE — TELEPHONE ENCOUNTER
LM for patient to call office. Can do telephone visit within next week. We need her spoke to before increase

## 2022-10-19 NOTE — TELEPHONE ENCOUNTER
PATIENT CALLED STATING THAT SHE WOULD LIKE TO KNOW IF DR. ERVIN WOULD INCREASE THE DOSAGE OF THE LEXAPRO, AS SHE FEELS THE 5 MG IS NOT HELPING AS MUCH AS IT USED TO.    PLEASE ADVISE  [phone]     [id number] [place] [facility] [address] - [phone]  - [phone]   [phone]

## 2022-11-13 DIAGNOSIS — F43.0 ACUTE REACTION TO STRESS: ICD-10-CM

## 2022-11-14 RX ORDER — ESCITALOPRAM OXALATE 5 MG/1
TABLET ORAL
Qty: 30 TABLET | Refills: 0 | Status: SHIPPED | OUTPATIENT
Start: 2022-11-14 | End: 2022-12-09 | Stop reason: SDUPTHER

## 2022-12-05 ENCOUNTER — OFFICE VISIT (OUTPATIENT)
Dept: FAMILY MEDICINE CLINIC | Facility: CLINIC | Age: 37
End: 2022-12-05

## 2022-12-05 ENCOUNTER — TELEPHONE (OUTPATIENT)
Dept: FAMILY MEDICINE CLINIC | Facility: CLINIC | Age: 37
End: 2022-12-05

## 2022-12-05 VITALS — HEIGHT: 66 IN | RESPIRATION RATE: 16 BRPM | BODY MASS INDEX: 23.47 KG/M2

## 2022-12-05 DIAGNOSIS — Z91.199 NO-SHOW FOR APPOINTMENT: Primary | ICD-10-CM

## 2022-12-05 NOTE — TELEPHONE ENCOUNTER
Patient made an appt on 11/30 for 1/5/22 with Dottie she was wanting medication changed.  Dottie will not change medication prescribed by another PCP    Patient was referred back up to Dr Child to discuss the changes.   she was up set cause she was told at the time she made the appt the Dottie would be able to take care of her.

## 2022-12-07 NOTE — PROGRESS NOTES
Subjective   Albina Alvarado is a 37 y.o. female.     Chief Complaint   Patient presents with   • Anxiety       History of Present Illness  Albina is here to follow up on her anxiety. She feels like the medicine is not as affected as it should be.  Anxiety  Presents for follow-up visit. Symptoms include decreased concentration, excessive worry, irritability, malaise, nervous/anxious behavior and obsessions. Patient reports no chest pain, nausea, palpitations, shortness of breath or suicidal ideas. The severity of symptoms is moderate. The patient sleeps 7 hours per night. Nighttime awakenings: several.     Compliance with medications: Lexapro 5mg.            I personally reviewed and updated the patient's allergies, medications, problem list, and past medical, surgical, social, and family history. I have reviewed and confirmed the accuracy of the History of Present Illness and Review of Symptoms as documented by the MA/LUCIANON/RN. Lebron Child MD    Family History   Problem Relation Age of Onset   • Hypertension Mother    • COPD Mother    • Ulcerative colitis Mother    • Atrial fibrillation Father    • No Known Problems Brother        Social History     Tobacco Use   • Smoking status: Former     Packs/day: 0.25     Years: 1.00     Pack years: 0.25     Types: Cigarettes     Start date: 2004     Quit date: 2005     Years since quittin.9   • Smokeless tobacco: Never   Vaping Use   • Vaping Use: Never used   Substance Use Topics   • Alcohol use: Yes   • Drug use: Never       Past Surgical History:   Procedure Laterality Date   • BREAST AUGMENTATION Bilateral        Patient Active Problem List   Diagnosis   • Acute bilateral low back pain without sciatica   • Dysmenorrhea   • History of tobacco use   • COVID-19 virus detected   • Anxiety   • Annual visit for general adult medical examination with abnormal findings   • Screening for malignant neoplasm of cervix   • Influenza A         Current Outpatient  "Medications:   •  albuterol sulfate  (90 Base) MCG/ACT inhaler, Inhale 2 puffs Every 4 (Four) Hours As Needed for Wheezing., Disp: 18 g, Rfl: 2  •  azithromycin (ZITHROMAX) 250 MG tablet, Take 2 tablets the first day, then 1 tablet daily for 4 days., Disp: 6 tablet, Rfl: 0  •  escitalopram (LEXAPRO) 10 MG tablet, Take 1 tablet by mouth Daily., Disp: 90 tablet, Rfl: 1  •  levonorgestrel (MIRENA) 20 MCG/24HR IUD, 1 each by Intrauterine route 1 (One) Time., Disp: , Rfl:   •  meloxicam (MOBIC) 15 MG tablet, Take 1 tablet by mouth Daily., Disp: 90 tablet, Rfl: 3         Review of Systems   Constitutional: Positive for irritability. Negative for chills and diaphoresis.   Eyes: Negative for visual disturbance.   Respiratory: Negative for shortness of breath.    Cardiovascular: Negative for chest pain and palpitations.   Gastrointestinal: Negative for abdominal pain and nausea.   Endocrine: Negative for polydipsia and polyphagia.   Musculoskeletal: Negative for neck stiffness.   Skin: Negative for color change and pallor.   Neurological: Negative for seizures and syncope.   Hematological: Negative for adenopathy.   Psychiatric/Behavioral: Positive for decreased concentration. Negative for hallucinations and suicidal ideas. The patient is nervous/anxious.        I have reviewed and confirmed the accuracy of the ROS as documented by the MA/LPN/RN Lebron Child MD      Objective   /68 (BP Location: Right arm, Patient Position: Sitting, Cuff Size: Adult)   Pulse 65   Temp 97.7 °F (36.5 °C)   Resp 18   Ht 166.4 cm (65.5\")   Wt 67.6 kg (149 lb)   SpO2 99%   BMI 24.42 kg/m²   BP Readings from Last 3 Encounters:   12/09/22 122/68   05/17/22 150/84   03/15/22 112/80     Wt Readings from Last 3 Encounters:   12/09/22 67.6 kg (149 lb)   05/17/22 65 kg (143 lb 3.2 oz)   03/15/22 59.4 kg (131 lb)     Physical Exam  Constitutional:       Appearance: Normal appearance. She is well-developed. She is not diaphoretic. "   HENT:      Head: Normocephalic.      Right Ear: Hearing, ear canal and external ear normal. A middle ear effusion is present. Tympanic membrane is erythematous.      Left Ear: Hearing, ear canal and external ear normal. A middle ear effusion is present. Tympanic membrane is erythematous.      Nose: Congestion present.      Right Sinus: Maxillary sinus tenderness and frontal sinus tenderness present.      Left Sinus: Maxillary sinus tenderness and frontal sinus tenderness present.      Mouth/Throat:      Pharynx: Posterior oropharyngeal erythema present.      Tonsils: No tonsillar abscesses. 1+ on the right. 1+ on the left.   Eyes:      General: Lids are normal.      Conjunctiva/sclera: Conjunctivae normal.      Pupils: Pupils are equal, round, and reactive to light.   Neck:      Meningeal: Brudzinski's sign and Kernig's sign absent.   Cardiovascular:      Rate and Rhythm: Normal rate and regular rhythm.      Pulses: Normal pulses.      Heart sounds: Normal heart sounds, S1 normal and S2 normal. No murmur heard.    No friction rub. No gallop.   Pulmonary:      Effort: Pulmonary effort is normal. No accessory muscle usage or respiratory distress.      Breath sounds: No stridor. Examination of the right-upper field reveals wheezing and rhonchi. Examination of the left-upper field reveals wheezing and rhonchi. Examination of the right-middle field reveals wheezing and rhonchi. Examination of the left-middle field reveals wheezing and rhonchi. Examination of the right-lower field reveals wheezing and rhonchi. Examination of the left-lower field reveals wheezing and rhonchi. Wheezing and rhonchi present. No decreased breath sounds or rales.   Abdominal:      General: Bowel sounds are normal. There is no distension.      Palpations: Abdomen is soft. There is no mass.      Tenderness: There is no abdominal tenderness.      Hernia: No hernia is present.   Skin:     General: Skin is warm and dry.      Coloration: Skin is  not pale.   Neurological:      Mental Status: She is alert and oriented to person, place, and time.      Cranial Nerves: No cranial nerve deficit.      Coordination: Coordination normal.      Gait: Gait normal.         Data / Lab Results:    No results found for: HGBA1C     Lab Results   Component Value Date    LDL 98 05/18/2021     No results found for: CHOL  Lab Results   Component Value Date    TRIG 39 05/18/2021     Lab Results   Component Value Date    HDL 63 05/18/2021     No results found for: PSA  Lab Results   Component Value Date    WBC 5.5 05/18/2021    HGB 12.6 05/18/2021    HCT 37.3 05/18/2021    MCV 92 05/18/2021     05/18/2021     Lab Results   Component Value Date    TSH 0.690 05/18/2021      Lab Results   Component Value Date    GLUCOSE 86 05/18/2021    BUN 10 05/18/2021    CREATININE 0.66 05/18/2021    EGFRIFNONA 116 05/18/2021    EGFRIFAFRI 133 05/18/2021    BCR 15 05/18/2021    K 4.2 05/18/2021    CO2 24 05/18/2021    CALCIUM 9.4 05/18/2021    PROTENTOTREF 6.9 05/18/2021    ALBUMIN 4.4 05/18/2021    LABIL2 1.8 05/18/2021    AST 18 05/18/2021    ALT 13 05/18/2021     No results found for: PUMA, RF, SEDRATE   No results found for: CRP   No results found for: IRON, TIBC, FERRITIN   No results found for: [id number]       Assessment & Plan      Medications        Problem List         LOS    Health maintenance Doing well, vaccines current.  Discussed calcium and vitamin D.  Discussed baby aspirin daily.  Pap current/followed by OB/GYN.  Discussed health maintenance, screening  Risk modification.  Lumbar disc disease.  Moderate narrowing L5-S1 per x-ray 2021.  Improved currently on meloxicam.  Rehabilitation exercise discussed.  Consider MRI if worsening symptoms.  Anxiety.  Initially improved, recurrent symptoms today on Lexapro increased to 10 mg daily.. Longstanding. Sleep improved on as needed melatonin.  Good social support.  Follow-up recheck.  COVID-19 viral upper respiratory infection.   Improved/resolved currently has completed course antibiotics/prednisone.  Has been vaccinated. Call return if respiratory difficulty worsening symptoms. Has completed quarantine.  Flu A.  Clinically improved, persistent cough/bronchoconstriction add Z-Bill.  Increase fluid intake.  Call return if fever worsening symptoms.        Diagnoses and all orders for this visit:    1. Anxiety (Primary)    2. History of tobacco use    3. Acute reaction to stress  -     Discontinue: escitalopram (LEXAPRO) 10 MG tablet; Take 1 tablet by mouth Daily.  Dispense: 90 tablet; Refill: 1  -     escitalopram (LEXAPRO) 10 MG tablet; Take 1 tablet by mouth Daily.  Dispense: 90 tablet; Refill: 1    4. Upper respiratory tract infection, unspecified type  -     Discontinue: azithromycin (ZITHROMAX) 250 MG tablet; Take 2 tablets the first day, then 1 tablet daily for 4 days.  Dispense: 6 tablet; Refill: 0  -     azithromycin (ZITHROMAX) 250 MG tablet; Take 2 tablets the first day, then 1 tablet daily for 4 days.  Dispense: 6 tablet; Refill: 0    5. Influenza A              Expected course, medications, and adverse effects discussed.  Call or return if worsening or persistent symptoms.  I wore protective equipment throughout this patient encounter including a mask, gloves, and eye protection.  Hand hygiene was performed before donning protective equipment and after removal when leaving the room. The complete contents of the Assessment and Plan and Data/Lab Results as documented above have been reviewed and addressed by myself with the patient today as part of an ongoing evaluation / treatment plan.  If some of the documentation has been copied from a previous note and is unchanged it indicates that this problem / plan has been assessed today but is stable from a previous visit and no changes have been recommended.

## 2022-12-09 ENCOUNTER — OFFICE VISIT (OUTPATIENT)
Dept: FAMILY MEDICINE CLINIC | Facility: CLINIC | Age: 37
End: 2022-12-09

## 2022-12-09 VITALS
RESPIRATION RATE: 18 BRPM | BODY MASS INDEX: 23.95 KG/M2 | DIASTOLIC BLOOD PRESSURE: 68 MMHG | WEIGHT: 149 LBS | HEART RATE: 65 BPM | TEMPERATURE: 97.7 F | HEIGHT: 66 IN | OXYGEN SATURATION: 99 % | SYSTOLIC BLOOD PRESSURE: 122 MMHG

## 2022-12-09 DIAGNOSIS — J10.1 INFLUENZA A: ICD-10-CM

## 2022-12-09 DIAGNOSIS — Z87.891 HISTORY OF TOBACCO USE: ICD-10-CM

## 2022-12-09 DIAGNOSIS — F43.0 ACUTE REACTION TO STRESS: ICD-10-CM

## 2022-12-09 DIAGNOSIS — J06.9 UPPER RESPIRATORY TRACT INFECTION, UNSPECIFIED TYPE: ICD-10-CM

## 2022-12-09 DIAGNOSIS — F41.9 ANXIETY: Primary | ICD-10-CM

## 2022-12-09 PROCEDURE — 99214 OFFICE O/P EST MOD 30 MIN: CPT | Performed by: FAMILY MEDICINE

## 2022-12-09 RX ORDER — AZITHROMYCIN 250 MG/1
TABLET, FILM COATED ORAL
Qty: 6 TABLET | Refills: 0 | Status: SHIPPED | OUTPATIENT
Start: 2022-12-09 | End: 2023-01-13

## 2022-12-09 RX ORDER — ESCITALOPRAM OXALATE 10 MG/1
10 TABLET ORAL DAILY
Qty: 90 TABLET | Refills: 1 | Status: SHIPPED | OUTPATIENT
Start: 2022-12-09 | End: 2022-12-09

## 2022-12-09 RX ORDER — ESCITALOPRAM OXALATE 10 MG/1
10 TABLET ORAL DAILY
Qty: 90 TABLET | Refills: 1 | Status: SHIPPED | OUTPATIENT
Start: 2022-12-09 | End: 2023-01-13

## 2022-12-09 RX ORDER — AZITHROMYCIN 250 MG/1
TABLET, FILM COATED ORAL
Qty: 6 TABLET | Refills: 0 | Status: SHIPPED | OUTPATIENT
Start: 2022-12-09 | End: 2022-12-09

## 2023-01-11 NOTE — PROGRESS NOTES
Subjective   Albina Alvarado is a 37 y.o. female.     Chief Complaint   Patient presents with   • Anxiety       Anxiety  Presents for follow-up visit. Symptoms include decreased concentration, excessive worry, insomnia, irritability, malaise, nervous/anxious behavior and obsessions. Patient reports no chest pain, nausea, palpitations, shortness of breath or suicidal ideas. Primary symptoms comment: nightmares. The severity of symptoms is moderate. The patient sleeps 7 hours per night. The quality of sleep is poor. Nighttime awakenings: several.     Compliance with medications is % (Lexapro 10mg).            I personally reviewed and updated the patient's allergies, medications, problem list, and past medical, surgical, social, and family history. I have reviewed and confirmed the accuracy of the History of Present Illness and Review of Symptoms as documented by the MA/LPN/RN. Sheri Alfred MA    Family History   Problem Relation Age of Onset   • Hypertension Mother    • COPD Mother    • Ulcerative colitis Mother    • Atrial fibrillation Father    • No Known Problems Brother        Social History     Tobacco Use   • Smoking status: Former     Packs/day: 0.25     Years: 1.00     Pack years: 0.25     Types: Cigarettes     Start date: 2004     Quit date: 2005     Years since quittin.0   • Smokeless tobacco: Never   Vaping Use   • Vaping Use: Never used   Substance Use Topics   • Alcohol use: Yes   • Drug use: Never       Past Surgical History:   Procedure Laterality Date   • BREAST AUGMENTATION Bilateral        Patient Active Problem List   Diagnosis   • Acute bilateral low back pain without sciatica   • Dysmenorrhea   • History of tobacco use   • COVID-19 virus detected   • Anxiety   • Annual visit for general adult medical examination with abnormal findings   • Screening for malignant neoplasm of cervix   • Influenza A         Current Outpatient Medications:   •  escitalopram (LEXAPRO) 10 MG  "tablet, Take 1 tablet by mouth Daily., Disp: 90 tablet, Rfl: 1  •  levonorgestrel (MIRENA) 20 MCG/24HR IUD, 1 each by Intrauterine route 1 (One) Time., Disp: , Rfl:   •  meloxicam (MOBIC) 15 MG tablet, Take 1 tablet by mouth Daily., Disp: 90 tablet, Rfl: 3  •  albuterol sulfate  (90 Base) MCG/ACT inhaler, Inhale 2 puffs Every 4 (Four) Hours As Needed for Wheezing., Disp: 18 g, Rfl: 2         Review of Systems   Constitutional: Positive for irritability. Negative for chills and diaphoresis.   Eyes: Negative for visual disturbance.   Respiratory: Negative for shortness of breath.    Cardiovascular: Negative for chest pain and palpitations.   Gastrointestinal: Negative for abdominal pain and nausea.   Endocrine: Negative for polydipsia and polyphagia.   Musculoskeletal: Negative for neck stiffness.   Skin: Negative for color change and pallor.   Neurological: Negative for seizures and syncope.   Hematological: Negative for adenopathy.   Psychiatric/Behavioral: Positive for decreased concentration. Negative for hallucinations and suicidal ideas. The patient is nervous/anxious and has insomnia.        I have reviewed and confirmed the accuracy of the ROS as documented by the MA/LPN/RN Sheri Alfred MA      Objective   Pulse 74   Temp 98.2 °F (36.8 °C)   Resp 16   Ht 166.4 cm (65.5\")   Wt 67.6 kg (149 lb)   LMP 01/08/2023 (Approximate)   SpO2 99%   BMI 24.42 kg/m²   BP Readings from Last 3 Encounters:   12/09/22 122/68   05/17/22 150/84   03/15/22 112/80     Wt Readings from Last 3 Encounters:   01/13/23 67.6 kg (149 lb)   12/09/22 67.6 kg (149 lb)   05/17/22 65 kg (143 lb 3.2 oz)     Physical Exam  Constitutional:       Appearance: Normal appearance. She is well-developed. She is not diaphoretic.   Cardiovascular:      Rate and Rhythm: Normal rate and regular rhythm.      Pulses: Normal pulses.      Heart sounds: Normal heart sounds, S1 normal and S2 normal. No murmur heard.    No friction rub. No " gallop.   Pulmonary:      Effort: Pulmonary effort is normal. No accessory muscle usage.      Breath sounds: Normal breath sounds. No stridor. No decreased breath sounds, wheezing, rhonchi or rales.   Abdominal:      General: Bowel sounds are normal. There is no distension.      Palpations: Abdomen is soft. Abdomen is not rigid. There is no mass or pulsatile mass.      Tenderness: There is no abdominal tenderness. There is no guarding or rebound. Negative signs include Morris's sign.      Hernia: No hernia is present.   Skin:     General: Skin is warm and dry.      Coloration: Skin is not pale.   Neurological:      Mental Status: She is alert and oriented to person, place, and time.      Coordination: Coordination normal.      Gait: Gait normal.         Data / Lab Results:    No results found for: HGBA1C     Lab Results   Component Value Date    LDL 98 05/18/2021     No results found for: CHOL  Lab Results   Component Value Date    TRIG 39 05/18/2021     Lab Results   Component Value Date    HDL 63 05/18/2021     No results found for: PSA  Lab Results   Component Value Date    WBC 5.5 05/18/2021    HGB 12.6 05/18/2021    HCT 37.3 05/18/2021    MCV 92 05/18/2021     05/18/2021     Lab Results   Component Value Date    TSH 0.690 05/18/2021      Lab Results   Component Value Date    GLUCOSE 86 05/18/2021    BUN 10 05/18/2021    CREATININE 0.66 05/18/2021    EGFRIFNONA 116 05/18/2021    EGFRIFAFRI 133 05/18/2021    BCR 15 05/18/2021    K 4.2 05/18/2021    CO2 24 05/18/2021    CALCIUM 9.4 05/18/2021    PROTENTOTREF 6.9 05/18/2021    ALBUMIN 4.4 05/18/2021    LABIL2 1.8 05/18/2021    AST 18 05/18/2021    ALT 13 05/18/2021     No results found for: PUMA, RF, SEDRATE   No results found for: CRP   No results found for: IRON, TIBC, FERRITIN   No results found for: [id number]       Assessment & Plan      Medications        Problem List         LOS    Health maintenance Doing well, vaccines current.  Discussed calcium and  vitamin D.  Discussed baby aspirin daily.  Pap current/followed by OB/GYN.  Discussed health maintenance, screening  Risk modification.  Lumbar disc disease.  Moderate narrowing L5-S1 per x-ray 2021.  Improved currently on meloxicam.  Rehabilitation exercise discussed.  Consider MRI if worsening symptoms.  Anxiety.    Did not tolerate increased dose Lexapro, changed to Zoloft.  Continue as needed buspirone. longstanding. Sleep improved on as needed melatonin.  Good social support.  Follow-up recheck.  COVID-19 viral upper respiratory infection.  Improved/resolved currently has completed course antibiotics/prednisone.  Chest x-ray benign 3/20.  Wheezing.  Possibly post COVID versus asthma.  Start albuterol/daily inhaled steroid.  Check PFTs.  Follow-up recheck.  Call return if worsening symptoms.      Diagnoses and all orders for this visit:    1. Anxiety (Primary)    2. History of tobacco use              Expected course, medications, and adverse effects discussed.  Call or return if worsening or persistent symptoms.  I wore protective equipment throughout this patient encounter including a mask, gloves, and eye protection.  Hand hygiene was performed before donning protective equipment and after removal when leaving the room. The complete contents of the Assessment and Plan and Data/Lab Results as documented above have been reviewed and addressed by myself with the patient today as part of an ongoing evaluation / treatment plan.  If some of the documentation has been copied from a previous note and is unchanged it indicates that this problem / plan has been assessed today but is stable from a previous visit and no changes have been recommended.

## 2023-01-13 ENCOUNTER — OFFICE VISIT (OUTPATIENT)
Dept: FAMILY MEDICINE CLINIC | Facility: CLINIC | Age: 38
End: 2023-01-13
Payer: COMMERCIAL

## 2023-01-13 VITALS
WEIGHT: 149 LBS | HEART RATE: 74 BPM | OXYGEN SATURATION: 99 % | RESPIRATION RATE: 16 BRPM | TEMPERATURE: 98.2 F | HEIGHT: 66 IN | BODY MASS INDEX: 23.95 KG/M2 | SYSTOLIC BLOOD PRESSURE: 110 MMHG | DIASTOLIC BLOOD PRESSURE: 80 MMHG

## 2023-01-13 DIAGNOSIS — F41.9 ANXIETY: Primary | ICD-10-CM

## 2023-01-13 DIAGNOSIS — R05.9 COUGH: ICD-10-CM

## 2023-01-13 DIAGNOSIS — J45.909 ASTHMA, UNSPECIFIED ASTHMA SEVERITY, UNSPECIFIED WHETHER COMPLICATED, UNSPECIFIED WHETHER PERSISTENT: ICD-10-CM

## 2023-01-13 DIAGNOSIS — Z87.891 HISTORY OF TOBACCO USE: ICD-10-CM

## 2023-01-13 DIAGNOSIS — R06.02 SHORTNESS OF BREATH: ICD-10-CM

## 2023-01-13 PROCEDURE — 99213 OFFICE O/P EST LOW 20 MIN: CPT | Performed by: FAMILY MEDICINE

## 2023-01-13 RX ORDER — ALBUTEROL SULFATE 90 UG/1
2 AEROSOL, METERED RESPIRATORY (INHALATION) EVERY 4 HOURS PRN
Qty: 18 G | Refills: 2 | Status: SHIPPED | OUTPATIENT
Start: 2023-01-13

## 2023-01-13 RX ORDER — SERTRALINE HYDROCHLORIDE 25 MG/1
25 TABLET, FILM COATED ORAL DAILY
Qty: 30 TABLET | Refills: 12 | Status: SHIPPED | OUTPATIENT
Start: 2023-01-13

## 2023-01-13 RX ORDER — FLUTICASONE PROPIONATE 220 UG/1
1 AEROSOL, METERED RESPIRATORY (INHALATION)
Qty: 12 G | Refills: 5 | Status: SHIPPED | OUTPATIENT
Start: 2023-01-13

## 2023-02-24 ENCOUNTER — OFFICE VISIT (OUTPATIENT)
Dept: FAMILY MEDICINE CLINIC | Facility: CLINIC | Age: 38
End: 2023-02-24
Payer: MEDICAID

## 2023-02-24 VITALS
TEMPERATURE: 98.4 F | HEIGHT: 66 IN | OXYGEN SATURATION: 99 % | RESPIRATION RATE: 16 BRPM | SYSTOLIC BLOOD PRESSURE: 122 MMHG | WEIGHT: 146.6 LBS | DIASTOLIC BLOOD PRESSURE: 76 MMHG | HEART RATE: 66 BPM | BODY MASS INDEX: 23.56 KG/M2

## 2023-02-24 DIAGNOSIS — Z87.891 HISTORY OF TOBACCO USE: ICD-10-CM

## 2023-02-24 DIAGNOSIS — F43.0 ACUTE REACTION TO STRESS: ICD-10-CM

## 2023-02-24 DIAGNOSIS — F41.9 ANXIETY: Primary | ICD-10-CM

## 2023-02-24 PROCEDURE — 99213 OFFICE O/P EST LOW 20 MIN: CPT | Performed by: FAMILY MEDICINE

## 2023-02-24 RX ORDER — BUSPIRONE HYDROCHLORIDE 7.5 MG/1
7.5 TABLET ORAL 3 TIMES DAILY PRN
Qty: 60 TABLET | Refills: 5 | Status: SHIPPED | OUTPATIENT
Start: 2023-02-24

## 2023-02-24 RX ORDER — LORAZEPAM 0.5 MG/1
TABLET ORAL
Qty: 10 TABLET | Refills: 0 | Status: SHIPPED | OUTPATIENT
Start: 2023-02-24

## 2023-05-30 DIAGNOSIS — M54.50 ACUTE BILATERAL LOW BACK PAIN WITHOUT SCIATICA: ICD-10-CM

## 2023-05-30 RX ORDER — MELOXICAM 15 MG/1
15 TABLET ORAL DAILY
Qty: 90 TABLET | Refills: 3 | Status: SHIPPED | OUTPATIENT
Start: 2023-05-30

## 2023-05-30 NOTE — TELEPHONE ENCOUNTER
Caller: Jorge Alvaradossica    Relationship: Self    Best call back number: [phone]    Requested Prescriptions:   Requested Prescriptions     Pending Prescriptions Disp Refills   • meloxicam (MOBIC) 15 MG tablet 90 tablet 3     Sig: Take 1 tablet by mouth Daily.        Pharmacy where request should be sent: [facility] [address] [place] [address] [address] - [phone]  - [phone] FX     Last office visit with prescribing clinician: 2/24/2023   Last telemedicine visit with prescribing clinician: Visit date not found   Next office visit with prescribing clinician: 8/25/2023     Additional details provided by patient:     Does the patient have less than a 3 day supply:  [x] Yes  [] No        Yoni Haas Rep   05/30/23 08:49 EDT

## 2024-01-19 DIAGNOSIS — F41.9 ANXIETY: ICD-10-CM

## 2024-01-19 RX ORDER — SERTRALINE HYDROCHLORIDE 25 MG/1
25 TABLET, FILM COATED ORAL DAILY
Qty: 30 TABLET | Refills: 0 | OUTPATIENT
Start: 2024-01-19

## 2024-01-20 DIAGNOSIS — F41.9 ANXIETY: ICD-10-CM

## 2024-01-22 DIAGNOSIS — F41.9 ANXIETY: ICD-10-CM

## 2024-01-22 RX ORDER — SERTRALINE HYDROCHLORIDE 25 MG/1
25 TABLET, FILM COATED ORAL DAILY
Qty: 30 TABLET | Refills: 0 | Status: SHIPPED | OUTPATIENT
Start: 2024-01-22

## 2024-01-22 RX ORDER — SERTRALINE HYDROCHLORIDE 25 MG/1
25 TABLET, FILM COATED ORAL DAILY
Qty: 30 TABLET | Refills: 0 | Status: SHIPPED | OUTPATIENT
Start: 2024-01-22 | End: 2024-01-22 | Stop reason: SDUPTHER

## 2024-01-22 NOTE — TELEPHONE ENCOUNTER
Caller: Albina Alvarado    Relationship: Self    Best call back number: [phone]     Requested Prescriptions:   Requested Prescriptions     Pending Prescriptions Disp Refills    sertraline (ZOLOFT) 25 MG tablet 30 tablet 0     Sig: Take 1 tablet by mouth Daily.        Pharmacy where request should be sent: [facility] [address] [place] [address] [phone] - [phone]  - [phone] FX     Last office visit with prescribing clinician: 2/24/2023   Last telemedicine visit with prescribing clinician: Visit date not found   Next office visit with prescribing clinician: Visit date not found     Additional details provided by patient:     Does the patient have less than a 3 day supply:  [x] Yes  [] No    Would you like a call back once the refill request has been completed: [] Yes [] No    If the office needs to give you a call back, can they leave a voicemail: [] Yes [] No    April Yoni Díaz Rep   01/22/24 08:16 EST

## 2024-01-23 NOTE — TELEPHONE ENCOUNTER
Called and left vm patient is in need of an appointment for her physical. We sent curtesy refill on zoloft but needs to be seen/ scheduled prior to any other refills

## 2024-02-23 NOTE — PROGRESS NOTES
Subjective   Albina Alvarado is a 38 y.o. female.     Chief Complaint   Patient presents with   • Anxiety       Anxiety  Presents for follow-up visit. Symptoms include insomnia and nervous/anxious behavior. Patient reports no chest pain, decreased concentration ( improving on Zoloft), depressed mood, excessive worry, irritability, malaise, nausea, obsessions, palpitations, shortness of breath or suicidal ideas. The severity of symptoms is moderate. The patient sleeps 7 hours per night. The quality of sleep is fair. Nighttime awakenings: several.     Compliance with medications is % (Zoloft 25mg).            I personally reviewed and updated the patient's allergies, medications, problem list, and past medical, surgical, social, and family history. I have reviewed and confirmed the accuracy of the History of Present Illness and Review of Symptoms as documented by the MA/LUCIANON/RN. Sheri Alfred MA    Family History   Problem Relation Age of Onset   • Hypertension Mother    • COPD Mother    • Ulcerative colitis Mother    • Atrial fibrillation Father    • No Known Problems Brother        Social History     Tobacco Use   • Smoking status: Former     Packs/day: 0.25     Years: 1.00     Additional pack years: 0.00     Total pack years: 0.25     Types: Cigarettes     Start date: 2004     Quit date: 2005     Years since quittin.1     Passive exposure: Past   • Smokeless tobacco: Never   Vaping Use   • Vaping Use: Never used   Substance Use Topics   • Alcohol use: Yes     Alcohol/week: 4.0 standard drinks of alcohol     Types: 4 Drinks containing 0.5 oz of alcohol per week   • Drug use: Not Currently       Past Surgical History:   Procedure Laterality Date   • BREAST AUGMENTATION Bilateral        Patient Active Problem List   Diagnosis   • Acute bilateral low back pain without sciatica   • Dysmenorrhea   • History of tobacco use   • COVID-19 virus detected   • Anxiety   • Annual visit for general adult  "medical examination with abnormal findings   • Screening for malignant neoplasm of cervix   • Influenza A         Current Outpatient Medications:   •  albuterol sulfate  (90 Base) MCG/ACT inhaler, Inhale 2 puffs Every 4 (Four) Hours As Needed for Wheezing., Disp: 18 g, Rfl: 2  •  busPIRone (BUSPAR) 7.5 MG tablet, Take 1 tablet by mouth 3 (Three) Times a Day As Needed (anxiety)., Disp: 60 tablet, Rfl: 5  •  levonorgestrel (MIRENA) 20 MCG/24HR IUD, 1 each by Intrauterine route 1 (One) Time., Disp: , Rfl:   •  meloxicam (MOBIC) 15 MG tablet, Take 1 tablet by mouth Daily., Disp: 90 tablet, Rfl: 3  •  sertraline (ZOLOFT) 25 MG tablet, Take 1 tablet by mouth Daily. Need appointment for further refills, Disp: 30 tablet, Rfl: 0  •  Spacer/Aero-Holding Chambers device, 1 each Daily As Needed (use with Flovent inhaler)., Disp: 1 each, Rfl: 0  •  fluticasone (Flovent HFA) 220 MCG/ACT inhaler, Inhale 1 puff 2 (Two) Times a Day. (Patient not taking: Reported on 2/26/2024), Disp: 12 g, Rfl: 5         Review of Systems   Constitutional:  Negative for irritability.   Respiratory:  Negative for shortness of breath.    Cardiovascular:  Negative for chest pain and palpitations.   Gastrointestinal:  Negative for nausea.   Psychiatric/Behavioral:  Negative for decreased concentration ( improving on Zoloft), suicidal ideas and depressed mood. The patient is nervous/anxious and has insomnia.        I have reviewed and confirmed the accuracy of the ROS as documented by the MA/LPN/RN Sheri Alfred MA      Objective   Pulse 94   Temp 97.1 °F (36.2 °C)   Resp 18   Ht 166.4 cm (65.5\")   Wt 61.7 kg (136 lb)   LMP 02/05/2024 (Approximate)   SpO2 97%   BMI 22.29 kg/m²   BP Readings from Last 3 Encounters:   02/24/23 122/76   01/13/23 110/80   12/09/22 122/68     Wt Readings from Last 3 Encounters:   02/26/24 61.7 kg (136 lb)   02/24/23 66.5 kg (146 lb 9.6 oz)   01/13/23 67.6 kg (149 lb)     Physical Exam    Data / Lab " "Results:    No results found for: \"HGBA1C\"     Lab Results   Component Value Date    LDL 98 05/18/2021     No results found for: \"CHOL\"  Lab Results   Component Value Date    TRIG 39 05/18/2021     Lab Results   Component Value Date    HDL 63 05/18/2021     No results found for: \"PSA\"  Lab Results   Component Value Date    WBC 5.5 05/18/2021    HGB 12.6 05/18/2021    HCT 37.3 05/18/2021    MCV 92 05/18/2021     05/18/2021     Lab Results   Component Value Date    TSH 0.690 05/18/2021      Lab Results   Component Value Date    GLUCOSE 86 05/18/2021    BUN 10 05/18/2021    CREATININE 0.66 05/18/2021    EGFRIFNONA 116 05/18/2021    EGFRIFAFRI 133 05/18/2021    BCR 15 05/18/2021    K 4.2 05/18/2021    CO2 24 05/18/2021    CALCIUM 9.4 05/18/2021    PROTENTOTREF 6.9 05/18/2021    ALBUMIN 4.4 05/18/2021    LABIL2 1.8 05/18/2021    AST 18 05/18/2021    ALT 13 05/18/2021     No results found for: \"PUMA\", \"RF\", \"SEDRATE\"   No results found for: \"CRP\"   No results found for: \"IRON\", \"TIBC\", \"FERRITIN\"   No results found for: \"[id number]\"       Assessment & Plan      Medications        Problem List         LOS  Health maintenance Doing well, vaccines current.  Discussed calcium and vitamin D.  Discussed baby aspirin daily.  Pap current/followed by OB/GYN.  Discussed health maintenance, screening  Risk modification.  Lumbar disc disease.  Moderate narrowing L5-S1 per x-ray 2021.  Improved currently on meloxicam.  Rehabilitation exercise discussed.  Consider MRI if worsening symptoms.  Anxiety.      Much improved today on Zoloft 25 mg daily, consider increased dose in 1 to 2 months if persistent symptoms.  Did not tolerate increased dose Lexapro, changed to Zoloft.  Continue as needed buspirone. longstanding. Sleep improved on as needed melatonin.  Good social support.  Follow-up recheck.  COVID-19 viral upper respiratory infection.  Improved/resolved currently has completed course antibiotics/prednisone.  Chest x-ray " benign 3/20.  Wheezing.  Possibly post COVID versus asthma.  Start albuterol/daily inhaled steroid.  Check PFTs.  Follow-up recheck.  Call return if worsening symptoms.  Fear of flying.  Continue as needed buspirone, Rx for as needed Ativan written.        Diagnoses and all orders for this visit:    1. Anxiety (Primary)    2. History of tobacco use              Expected course, medications, and adverse effects discussed.  Call or return if worsening or persistent symptoms.  I wore protective equipment throughout this patient encounter including a mask, gloves, and eye protection.  Hand hygiene was performed before donning protective equipment and after removal when leaving the room. The complete contents of the Assessment and Plan and Data/Lab Results as documented above have been reviewed and addressed by myself with the patient today as part of an ongoing evaluation / treatment plan.  If some of the documentation has been copied from a previous note and is unchanged it indicates that this problem / plan has been assessed today but is stable from a previous visit and no changes have been recommended.

## 2024-02-26 ENCOUNTER — OFFICE VISIT (OUTPATIENT)
Dept: FAMILY MEDICINE CLINIC | Facility: CLINIC | Age: 39
End: 2024-02-26
Payer: MEDICAID

## 2024-02-26 VITALS
TEMPERATURE: 97.1 F | HEART RATE: 94 BPM | WEIGHT: 136 LBS | BODY MASS INDEX: 21.86 KG/M2 | OXYGEN SATURATION: 97 % | SYSTOLIC BLOOD PRESSURE: 130 MMHG | DIASTOLIC BLOOD PRESSURE: 76 MMHG | RESPIRATION RATE: 18 BRPM | HEIGHT: 66 IN

## 2024-02-26 DIAGNOSIS — M54.50 ACUTE BILATERAL LOW BACK PAIN WITHOUT SCIATICA: ICD-10-CM

## 2024-02-26 DIAGNOSIS — Z87.891 HISTORY OF TOBACCO USE: ICD-10-CM

## 2024-02-26 DIAGNOSIS — F41.9 ANXIETY: Primary | ICD-10-CM

## 2024-02-26 DIAGNOSIS — L70.0 ACNE VULGARIS: ICD-10-CM

## 2024-02-26 PROCEDURE — 99213 OFFICE O/P EST LOW 20 MIN: CPT | Performed by: FAMILY MEDICINE

## 2024-02-26 RX ORDER — MELOXICAM 15 MG/1
15 TABLET ORAL DAILY
Qty: 90 TABLET | Refills: 3 | Status: SHIPPED | OUTPATIENT
Start: 2024-02-26

## 2024-02-26 RX ORDER — SERTRALINE HYDROCHLORIDE 25 MG/1
25 TABLET, FILM COATED ORAL DAILY
Qty: 90 TABLET | Refills: 3 | Status: SHIPPED | OUTPATIENT
Start: 2024-02-26

## 2024-02-26 NOTE — PROGRESS NOTES
Subjective   Albina Alvarado is a 38 y.o. female.     Chief Complaint   Patient presents with    Anxiety       Anxiety  Presents for follow-up visit. Symptoms include insomnia and nervous/anxious behavior. Patient reports no chest pain, decreased concentration ( improving on Zoloft), depressed mood, excessive worry, irritability, malaise, nausea, obsessions, palpitations, shortness of breath or suicidal ideas. The severity of symptoms is moderate. The patient sleeps 7 hours per night. The quality of sleep is fair. Nighttime awakenings: several.     Compliance with medications is % (Zoloft 25mg).            I personally reviewed and updated the patient's allergies, medications, problem list, and past medical, surgical, social, and family history. I have reviewed and confirmed the accuracy of the History of Present Illness and Review of Symptoms as documented by the MA/LPN/RN. Lebron Child MD    Family History   Problem Relation Age of Onset    Hypertension Mother     COPD Mother     Ulcerative colitis Mother     Atrial fibrillation Father     No Known Problems Brother        Social History     Tobacco Use    Smoking status: Former     Current packs/day: 0.00     Average packs/day: 0.3 packs/day for 1 year (0.3 ttl pk-yrs)     Types: Cigarettes     Start date: 2004     Quit date: 2005     Years since quittin.1     Passive exposure: Past    Smokeless tobacco: Never   Vaping Use    Vaping status: Never Used   Substance Use Topics    Alcohol use: Yes     Alcohol/week: 4.0 standard drinks of alcohol     Types: 4 Drinks containing 0.5 oz of alcohol per week    Drug use: Not Currently       Past Surgical History:   Procedure Laterality Date    BREAST AUGMENTATION Bilateral        Patient Active Problem List   Diagnosis    Acute bilateral low back pain without sciatica    Dysmenorrhea    History of tobacco use    COVID-19 virus detected    Anxiety    Annual visit for general adult medical examination with  abnormal findings    Screening for malignant neoplasm of cervix    Influenza A    Acne vulgaris         Current Outpatient Medications:     albuterol sulfate  (90 Base) MCG/ACT inhaler, Inhale 2 puffs Every 4 (Four) Hours As Needed for Wheezing., Disp: 18 g, Rfl: 2    busPIRone (BUSPAR) 7.5 MG tablet, Take 1 tablet by mouth 3 (Three) Times a Day As Needed (anxiety)., Disp: 60 tablet, Rfl: 5    levonorgestrel (MIRENA) 20 MCG/24HR IUD, 1 each by Intrauterine route 1 (One) Time., Disp: , Rfl:     meloxicam (MOBIC) 15 MG tablet, Take 1 tablet by mouth Daily., Disp: 90 tablet, Rfl: 3    sertraline (ZOLOFT) 25 MG tablet, Take 1 tablet by mouth Daily. Need appointment for further refills, Disp: 90 tablet, Rfl: 3    Spacer/Aero-Holding Chambers device, 1 each Daily As Needed (use with Flovent inhaler)., Disp: 1 each, Rfl: 0    fluticasone (Flovent HFA) 220 MCG/ACT inhaler, Inhale 1 puff 2 (Two) Times a Day. (Patient not taking: Reported on 2/26/2024), Disp: 12 g, Rfl: 5         Review of Systems   Constitutional:  Negative for chills, diaphoresis and irritability.   Eyes:  Negative for visual disturbance.   Respiratory:  Negative for shortness of breath.    Cardiovascular:  Negative for chest pain and palpitations.   Gastrointestinal:  Negative for abdominal pain and nausea.   Endocrine: Negative for polydipsia and polyphagia.   Genitourinary:  Positive for dysuria.   Musculoskeletal:  Negative for neck stiffness.   Skin:  Negative for color change and pallor.   Neurological:  Negative for seizures and syncope.   Hematological:  Negative for adenopathy.   Psychiatric/Behavioral:  Negative for decreased concentration ( improving on Zoloft), hallucinations, suicidal ideas and depressed mood. The patient is nervous/anxious and has insomnia.        I have reviewed and confirmed the accuracy of the ROS as documented by the MA/LPN/RN Lebron Child MD      Objective   /76 (BP Location: Left arm, Patient Position:  "Sitting, Cuff Size: Adult)   Pulse 94   Temp 97.1 °F (36.2 °C)   Resp 18   Ht 166.4 cm (65.5\")   Wt 61.7 kg (136 lb)   LMP 02/05/2024 (Approximate)   SpO2 97%   BMI 22.29 kg/m²   BP Readings from Last 3 Encounters:   02/26/24 130/76   02/24/23 122/76   01/13/23 110/80     Wt Readings from Last 3 Encounters:   02/26/24 61.7 kg (136 lb)   02/24/23 66.5 kg (146 lb 9.6 oz)   01/13/23 67.6 kg (149 lb)     Physical Exam  Constitutional:       Appearance: Normal appearance. She is well-developed. She is not diaphoretic.   Cardiovascular:      Rate and Rhythm: Normal rate and regular rhythm.      Pulses: Normal pulses.      Heart sounds: Normal heart sounds, S1 normal and S2 normal. No murmur heard.     No friction rub. No gallop.   Pulmonary:      Effort: Pulmonary effort is normal. No accessory muscle usage.      Breath sounds: Normal breath sounds. No stridor. No decreased breath sounds, wheezing, rhonchi or rales.   Abdominal:      General: Bowel sounds are normal. There is no distension.      Palpations: Abdomen is soft. Abdomen is not rigid. There is no mass or pulsatile mass.      Tenderness: There is no abdominal tenderness. There is no guarding or rebound. Negative signs include Morris's sign.      Hernia: No hernia is present.   Skin:     General: Skin is warm and dry.      Coloration: Skin is not pale.   Neurological:      Mental Status: She is alert and oriented to person, place, and time.      Coordination: Coordination normal.      Gait: Gait normal.         Data / Lab Results:    No results found for: \"HGBA1C\"     Lab Results   Component Value Date    LDL 98 05/18/2021     No results found for: \"CHOL\"  Lab Results   Component Value Date    TRIG 39 05/18/2021     Lab Results   Component Value Date    HDL 63 05/18/2021     No results found for: \"PSA\"  Lab Results   Component Value Date    WBC 5.5 05/18/2021    HGB 12.6 05/18/2021    HCT 37.3 05/18/2021    MCV 92 05/18/2021     05/18/2021     Lab " "Results   Component Value Date    TSH 0.690 05/18/2021      Lab Results   Component Value Date    GLUCOSE 86 05/18/2021    BUN 10 05/18/2021    CREATININE 0.66 05/18/2021    EGFRIFNONA 116 05/18/2021    EGFRIFAFRI 133 05/18/2021    BCR 15 05/18/2021    K 4.2 05/18/2021    CO2 24 05/18/2021    CALCIUM 9.4 05/18/2021    PROTENTOTREF 6.9 05/18/2021    ALBUMIN 4.4 05/18/2021    LABIL2 1.8 05/18/2021    AST 18 05/18/2021    ALT 13 05/18/2021     No results found for: \"PUMA\", \"RF\", \"SEDRATE\"   No results found for: \"CRP\"   No results found for: \"IRON\", \"TIBC\", \"FERRITIN\"   No results found for: \"[id number]\"       Assessment & Plan      Medications        Problem List         LOS  Health maintenance Doing well, vaccines current.  Discussed calcium and vitamin D.  Discussed baby aspirin daily.  Pap current/followed by OB/GYN.  Discussed health maintenance, screening  Risk modification.  Lumbar disc disease.  Moderate narrowing L5-S1 per x-ray 2021.  Improved currently on meloxicam.  Rehabilitation exercise discussed.  Consider MRI if worsening symptoms.  Anxiety.      Much improved today on Zoloft 25 mg daily Did not tolerate increased dose Lexapro, changed to Zoloft.  Continue as needed buspirone. longstanding. Sleep improved on as needed melatonin.  Good social support.  Follow-up recheck.  COVID-19 viral upper respiratory infection.  Improved/resolved currently has completed course antibiotics/prednisone.  Chest x-ray benign 3/20.  Wheezing.  Possibly post COVID versus asthma.  Start albuterol/daily inhaled steroid.  Check PFTs.  Follow-up recheck.  Call return if worsening symptoms.  Fear of flying.  Continue as needed buspirone, Rx for as needed Ativan written.  Acne/hypermelanosis.  Start as needed tretinoin, risk of birth defects in early pregnancy discussed, she is not planning any further pregnancies/has Mirena in place.      Diagnoses and all orders for this visit:    1. Anxiety (Primary)  -     sertraline (ZOLOFT) " 25 MG tablet; Take 1 tablet by mouth Daily. Need appointment for further refills  Dispense: 90 tablet; Refill: 3    2. History of tobacco use    3. Acute bilateral low back pain without sciatica  -     meloxicam (MOBIC) 15 MG tablet; Take 1 tablet by mouth Daily.  Dispense: 90 tablet; Refill: 3    4. Acne vulgaris              Expected course, medications, and adverse effects discussed.  Call or return if worsening or persistent symptoms.  I wore protective equipment throughout this patient encounter including a mask, gloves, and eye protection.  Hand hygiene was performed before donning protective equipment and after removal when leaving the room. The complete contents of the Assessment and Plan and Data/Lab Results as documented above have been reviewed and addressed by myself with the patient today as part of an ongoing evaluation / treatment plan.  If some of the documentation has been copied from a previous note and is unchanged it indicates that this problem / plan has been assessed today but is stable from a previous visit and no changes have been recommended.

## 2024-03-07 PROBLEM — L70.0 ACNE VULGARIS: Status: ACTIVE | Noted: 2024-03-07

## 2024-03-07 RX ORDER — TRETINOIN 1 MG/G
1 CREAM TOPICAL NIGHTLY
Qty: 45 G | Refills: 5 | Status: SHIPPED | OUTPATIENT
Start: 2024-03-07

## 2024-10-25 ENCOUNTER — TELEPHONE (OUTPATIENT)
Dept: FAMILY MEDICINE CLINIC | Facility: CLINIC | Age: 39
End: 2024-10-25

## 2024-10-25 ENCOUNTER — OFFICE VISIT (OUTPATIENT)
Dept: FAMILY MEDICINE CLINIC | Facility: CLINIC | Age: 39
End: 2024-10-25

## 2024-10-25 VITALS
BODY MASS INDEX: 21.73 KG/M2 | SYSTOLIC BLOOD PRESSURE: 127 MMHG | WEIGHT: 135.2 LBS | RESPIRATION RATE: 18 BRPM | HEART RATE: 65 BPM | DIASTOLIC BLOOD PRESSURE: 83 MMHG | TEMPERATURE: 97.9 F | HEIGHT: 66 IN

## 2024-10-25 DIAGNOSIS — J30.9 ALLERGIC RHINITIS, UNSPECIFIED SEASONALITY, UNSPECIFIED TRIGGER: ICD-10-CM

## 2024-10-25 DIAGNOSIS — J45.909 ASTHMA, UNSPECIFIED ASTHMA SEVERITY, UNSPECIFIED WHETHER COMPLICATED, UNSPECIFIED WHETHER PERSISTENT: ICD-10-CM

## 2024-10-25 DIAGNOSIS — Z28.21 INFLUENZA VACCINATION DECLINED: ICD-10-CM

## 2024-10-25 DIAGNOSIS — N89.8 VAGINAL ODOR: ICD-10-CM

## 2024-10-25 DIAGNOSIS — N89.8 VAGINAL DISCHARGE: Primary | ICD-10-CM

## 2024-10-25 RX ORDER — CETIRIZINE HYDROCHLORIDE 10 MG/1
10 TABLET ORAL DAILY
COMMUNITY

## 2024-10-25 RX ORDER — METRONIDAZOLE 500 MG/1
500 TABLET ORAL EVERY 12 HOURS
Qty: 14 TABLET | Refills: 0 | Status: SHIPPED | OUTPATIENT
Start: 2024-10-25

## 2024-10-25 RX ORDER — LEVALBUTEROL TARTRATE 45 UG/1
AEROSOL, METERED ORAL
Qty: 15 G | Refills: 1 | Status: SHIPPED | OUTPATIENT
Start: 2024-10-25

## 2024-10-25 RX ORDER — MONTELUKAST SODIUM 10 MG/1
10 TABLET ORAL NIGHTLY
Qty: 30 TABLET | Refills: 2 | Status: SHIPPED | OUTPATIENT
Start: 2024-10-25

## 2024-10-25 NOTE — TELEPHONE ENCOUNTER
Caller: Albina Alvarado    Relationship: Self    Best call back number: [phone]    PATIENT WAS PRESCRIBED METRONIDAZOLE TODAY AND SHE HAS HAD THIS MEDICATION BEFORE IN THE PAST AND HAD A REACTION TO IT SHE THINKS AND WONDERED IF THERE WAS AN ALTERNATIVE MEDICATION WE CAN SEND TO:  [facility] [address] [facility] [facility], [place] - [address] - [phone]  - [phone]         PLEASE ADVISE

## 2024-10-25 NOTE — PROGRESS NOTES
Chief Complaint  Vaginal Odor    Subjective          Albina is a 38 y.o. female  who presents to [facility]     Patient Care Team:  Lebron Child MD as PCP - General     History of Present Illness  Albina is here today for vaginal odor    Location: vaginal odor   Quality: fishy odor  Severity: mild  Duration: for 5 days  Timing: constant  Context: She has an IUD   No new sexual partners  No periods due to IUD  Tried OTC monistat and it did not help  No pain with sex  Alleviating factors: nothing  Aggravating factors: nothing makes worse  Associated Symptoms: no vaginal bleeding    ++++++++++++++++++++++++    She is also coughing  Reports she has allergies and asthma  Taking Zyrtec and Nyquil  She does not use her albuterol inhaler because it makes her heart beat fast.      Albina Alvarado  has a past medical history of Allergic rhinitis, Anxiety, Arthritis, and Asthma.      Review of Systems   Constitutional:  Negative for fever.   HENT:  Negative for ear pain and sore throat.    Respiratory:  Positive for cough. Negative for shortness of breath.    Cardiovascular:  Negative for chest pain, palpitations and leg swelling.   Gastrointestinal:  Negative for abdominal pain.   Genitourinary:  Negative for menstrual problem, pelvic pain, vaginal bleeding, vaginal discharge and vaginal pain.   Allergic/Immunologic: Positive for environmental allergies.        Family History   Problem Relation Age of Onset    Hypertension Mother     COPD Mother     Ulcerative colitis Mother     Atrial fibrillation Father     No Known Problems Brother         Past Surgical History:   Procedure Laterality Date    BREAST AUGMENTATION Bilateral     COLONOSCOPY W/ BIOPSIES  05/15/2009    Mild rectal hyerpemia, nonspecific, and a few small internal hermorrhoids otherwise normal. Biopsies taken.  Dr. Agnieszka Khan    HERNIA REPAIR Left 08/23/2011    inguinal; Dr. Hemalatha Wheat        Social History      Socioeconomic History    Marital status:     Number of children: 2   Tobacco Use    Smoking status: Former     Current packs/day: 0.00     Average packs/day: 0.3 packs/day for 1 year (0.3 ttl pk-yrs)     Types: Cigarettes     Start date: 2004     Quit date: 2005     Years since quittin.8     Passive exposure: Past    Smokeless tobacco: Never   Vaping Use    Vaping status: Never Used   Substance and Sexual Activity    Alcohol use: Yes     Alcohol/week: 4.0 standard drinks of alcohol     Types: 4 Drinks containing 0.5 oz of alcohol per week    Drug use: Not Currently    Sexual activity: Yes     Partners: Male     Birth control/protection: I.U.D.        Immunization History   Administered Date(s) Administered    COVID-19 (PFIZER) Purple Cap Monovalent 2021, 2021    PPD Test 2023    Tdap 2011, 2014       Objective       Current Outpatient Medications:     levonorgestrel (MIRENA) 20 MCG/24HR IUD, 1 each by Intrauterine route 1 (One) Time., Disp: , Rfl:     meloxicam (MOBIC) 15 MG tablet, Take 1 tablet by mouth Daily., Disp: 90 tablet, Rfl: 3    sertraline (ZOLOFT) 25 MG tablet, Take 1 tablet by mouth Daily. Need appointment for further refills, Disp: 90 tablet, Rfl: 3    cetirizine (zyrTEC) 10 MG tablet, Take 1 tablet by mouth Daily., Disp: , Rfl:     levalbuterol (XOPENEX HFA) 45 MCG/ACT inhaler, 1 to 2 puffs inhalation every 4-6 hours as needed for cough and shortness of breath, Disp: 15 g, Rfl: 1    metroNIDAZOLE (FLAGYL) 500 MG tablet, Take 1 tablet by mouth Every 12 (Twelve) Hours. Do not use alcohol for at least 24 hours after the last dose., Disp: 14 tablet, Rfl: 0    montelukast (Singulair) 10 MG tablet, Take 1 tablet by mouth Every Night., Disp: 30 tablet, Rfl: 2    tretinoin (RETIN-A) 0.1 % cream, Apply 1 Application topically to the appropriate area as directed Every Night. (Patient not taking: Reported on 10/25/2024), Disp: 45 g, Rfl: 5    Vital Signs:     "  /83   Pulse 65   Temp 97.9 °F (36.6 °C) (Temporal)   Resp 18   Ht 166.4 cm (65.51\")   Wt 61.3 kg (135 lb 3.2 oz)   BMI 22.15 kg/m²     Vitals:    10/25/24 1020   BP: 127/83   Pulse: 65   Resp: 18   Temp: 97.9 °F (36.6 °C)   TempSrc: Temporal   Weight: 61.3 kg (135 lb 3.2 oz)   Height: 166.4 cm (65.51\")      Physical Exam  Vitals reviewed.   Constitutional:       General: She is not in acute distress.     Appearance: Normal appearance. She is normal weight.   HENT:      Head: Normocephalic and atraumatic.      Right Ear: Tympanic membrane, ear canal and external ear normal.      Left Ear: Tympanic membrane, ear canal and external ear normal.      Mouth/Throat:      Mouth: Mucous membranes are moist.      Pharynx: Oropharynx is clear.   Eyes:      General: No scleral icterus.     Conjunctiva/sclera: Conjunctivae normal.   Cardiovascular:      Rate and Rhythm: Normal rate and regular rhythm.      Heart sounds: Normal heart sounds.   Pulmonary:      Effort: Pulmonary effort is normal. No respiratory distress.      Breath sounds: Normal breath sounds.   Abdominal:      General: Bowel sounds are normal. There is no distension.      Palpations: Abdomen is soft. There is no mass.      Tenderness: There is no abdominal tenderness. There is no right CVA tenderness, left CVA tenderness, guarding or rebound.   Genitourinary:     General: Normal vulva.      Exam position: Lithotomy position.      Pubic Area: No rash.       Vagina: Vaginal discharge present. No tenderness or bleeding.      Cervix: No cervical motion tenderness, friability or erythema.      Uterus: Not tender.       Comments: IUD strings noted.  White discharge in vaginal vault.  Vaginal swab obtained.  Musculoskeletal:      Cervical back: Neck supple.      Right lower leg: No edema.      Left lower leg: No edema.   Lymphadenopathy:      Cervical: No cervical adenopathy.   Skin:     General: Skin is warm and dry.   Neurological:      Mental Status: " She is alert and oriented to person, place, and time.   Psychiatric:         Mood and Affect: Mood normal.         Behavior: Behavior normal.           Assessment and Plan    Diagnoses and all orders for this visit:    1. Vaginal discharge (Primary)  Comments:  Treat for presumptive BV with metronidazole.  Advised to not drink alcohol.  Orders:  -     metroNIDAZOLE (FLAGYL) 500 MG tablet; Take 1 tablet by mouth Every 12 (Twelve) Hours. Do not use alcohol for at least 24 hours after the last dose.  Dispense: 14 tablet; Refill: 0  -     NuSwab VG+ - Swab, Vagina    2. Vaginal odor    3. Allergic rhinitis, unspecified seasonality, unspecified trigger  -     montelukast (Singulair) 10 MG tablet; Take 1 tablet by mouth Every Night.  Dispense: 30 tablet; Refill: 2    4. Asthma, unspecified asthma severity, unspecified whether complicated, unspecified whether persistent  -     levalbuterol (XOPENEX HFA) 45 MCG/ACT inhaler; 1 to 2 puffs inhalation every 4-6 hours as needed for cough and shortness of breath  Dispense: 15 g; Refill: 1    5. Influenza vaccination declined  Comments:  Discussed flu vaccine and she declined.       Begin Singulair 10 mg at bedtime.  Continue Zyrtec 10 mg daily.   Begin Xopenex for rescue inhaler.      Follow Up   Return if symptoms worsen or fail to improve.  Patient was given instructions and counseling regarding her condition or for health maintenance advice. Please see specific information pulled into the AVS if appropriate.    There are no Patient Instructions on file for this visit.

## 2024-10-29 ENCOUNTER — PATIENT MESSAGE (OUTPATIENT)
Dept: FAMILY MEDICINE CLINIC | Facility: CLINIC | Age: 39
End: 2024-10-29

## 2024-10-29 DIAGNOSIS — B96.89 BV (BACTERIAL VAGINOSIS): Primary | ICD-10-CM

## 2024-10-29 DIAGNOSIS — N76.0 BV (BACTERIAL VAGINOSIS): Primary | ICD-10-CM

## 2024-10-29 LAB
A VAGINAE DNA VAG QL NAA+PROBE: ABNORMAL SCORE
BVAB2 DNA VAG QL NAA+PROBE: ABNORMAL SCORE
C ALBICANS DNA VAG QL NAA+PROBE: NEGATIVE
C GLABRATA DNA VAG QL NAA+PROBE: NEGATIVE
C TRACH DNA SPEC QL NAA+PROBE: NEGATIVE
MEGA1 DNA VAG QL NAA+PROBE: ABNORMAL SCORE
N GONORRHOEA DNA VAG QL NAA+PROBE: NEGATIVE
T VAGINALIS DNA VAG QL NAA+PROBE: NEGATIVE

## 2024-10-29 RX ORDER — CLINDAMYCIN PHOSPHATE 20 MG/G
1 CREAM VAGINAL
Qty: 7 G | Refills: 0 | Status: SHIPPED | OUTPATIENT
Start: 2024-10-29 | End: 2024-11-05

## 2025-01-28 ENCOUNTER — OFFICE VISIT (OUTPATIENT)
Dept: FAMILY MEDICINE CLINIC | Facility: CLINIC | Age: 40
End: 2025-01-28

## 2025-01-28 VITALS
HEART RATE: 77 BPM | RESPIRATION RATE: 18 BRPM | OXYGEN SATURATION: 99 % | HEIGHT: 66 IN | WEIGHT: 137 LBS | BODY MASS INDEX: 22.02 KG/M2 | TEMPERATURE: 98 F | DIASTOLIC BLOOD PRESSURE: 72 MMHG | SYSTOLIC BLOOD PRESSURE: 116 MMHG

## 2025-01-28 DIAGNOSIS — N39.0 ACUTE UTI: Primary | ICD-10-CM

## 2025-01-28 DIAGNOSIS — Z87.891 HISTORY OF TOBACCO USE: ICD-10-CM

## 2025-01-28 LAB
BILIRUB BLD-MCNC: NEGATIVE MG/DL
CLARITY, POC: CLEAR
COLOR UR: ABNORMAL
GLUCOSE UR STRIP-MCNC: NEGATIVE MG/DL
KETONES UR QL: NEGATIVE
LEUKOCYTE EST, POC: ABNORMAL
NITRITE UR-MCNC: NEGATIVE MG/ML
PH UR: 5 [PH] (ref 5–8)
PROT UR STRIP-MCNC: NEGATIVE MG/DL
RBC # UR STRIP: ABNORMAL /UL
SP GR UR: 1 (ref 1–1.03)
UROBILINOGEN UR QL: ABNORMAL

## 2025-01-28 PROCEDURE — 81002 URINALYSIS NONAUTO W/O SCOPE: CPT | Performed by: FAMILY MEDICINE

## 2025-01-28 PROCEDURE — 99213 OFFICE O/P EST LOW 20 MIN: CPT | Performed by: FAMILY MEDICINE

## 2025-01-28 RX ORDER — CIPROFLOXACIN 500 MG/1
500 TABLET, FILM COATED ORAL 2 TIMES DAILY
Qty: 20 TABLET | Refills: 0 | Status: SHIPPED | OUTPATIENT
Start: 2025-01-28

## 2025-01-28 NOTE — PROGRESS NOTES
Subjective   Albina Alvarado is a 39 y.o. female.     Chief Complaint   Patient presents with    Acute UTI       Urinary Tract Infection   This is a recurrent problem. The current episode started 1 to 4 weeks ago. The problem occurs constantly. The pain is at a severity of 7/10. The pain is moderate. There has been no fever. Associated symptoms include frequency and urgency. Pertinent negatives include no chills, discharge, hematuria, hesitancy, nausea or vomiting. Associated symptoms comments: Headaches. She has tried antibiotics for the symptoms. The treatment provided mild relief.            I personally reviewed and updated the patient's allergies, medications, problem list, and past medical, surgical, social, and family history. I have reviewed and confirmed the accuracy of the History of Present Illness and Review of Symptoms as documented by the MA/LPN/RN. Lebron Child MD    Family History   Problem Relation Age of Onset    Hypertension Mother     COPD Mother     Ulcerative colitis Mother     Atrial fibrillation Father     No Known Problems Brother        Social History     Tobacco Use    Smoking status: Former     Current packs/day: 0.00     Average packs/day: 0.3 packs/day for 1 year (0.3 ttl pk-yrs)     Types: Cigarettes     Start date: 2004     Quit date: 2005     Years since quittin.0     Passive exposure: Past    Smokeless tobacco: Never   Vaping Use    Vaping status: Never Used   Substance Use Topics    Alcohol use: Yes     Alcohol/week: 4.0 standard drinks of alcohol     Types: 4 Drinks containing 0.5 oz of alcohol per week    Drug use: Not Currently       Past Surgical History:   Procedure Laterality Date    BREAST AUGMENTATION Bilateral     COLONOSCOPY W/ BIOPSIES  05/15/2009    Mild rectal hyerpemia, nonspecific, and a few small internal hermorrhoids otherwise normal. Biopsies taken.  Dr. Agnieszka Khan    HERNIA REPAIR Left 2011    inguinal; Dr. Hemalatha Wheat       Patient  Active Problem List   Diagnosis    Acute bilateral low back pain without sciatica    Dysmenorrhea    History of tobacco use    COVID-19 virus detected    Anxiety    Annual visit for general adult medical examination with abnormal findings    Screening for malignant neoplasm of cervix    Influenza A    Acne vulgaris         Current Outpatient Medications:     levonorgestrel (MIRENA) 20 MCG/24HR IUD, 1 each by Intrauterine route 1 (One) Time., Disp: , Rfl:     meloxicam (MOBIC) 15 MG tablet, Take 1 tablet by mouth Daily., Disp: 90 tablet, Rfl: 3    metroNIDAZOLE (FLAGYL) 500 MG tablet, Take 1 tablet by mouth Every 12 (Twelve) Hours. Do not use alcohol for at least 24 hours after the last dose., Disp: 14 tablet, Rfl: 0    sertraline (ZOLOFT) 25 MG tablet, Take 1 tablet by mouth Daily. Need appointment for further refills, Disp: 90 tablet, Rfl: 3    cetirizine (zyrTEC) 10 MG tablet, Take 1 tablet by mouth Daily. (Patient not taking: Reported on 1/28/2025), Disp: , Rfl:     ciprofloxacin (CIPRO) 500 MG tablet, Take 1 tablet by mouth 2 (Two) Times a Day., Disp: 20 tablet, Rfl: 0    levalbuterol (XOPENEX HFA) 45 MCG/ACT inhaler, 1 to 2 puffs inhalation every 4-6 hours as needed for cough and shortness of breath (Patient not taking: Reported on 1/28/2025), Disp: 15 g, Rfl: 1    montelukast (Singulair) 10 MG tablet, Take 1 tablet by mouth Every Night. (Patient not taking: Reported on 1/28/2025), Disp: 30 tablet, Rfl: 2    tretinoin (RETIN-A) 0.1 % cream, Apply 1 Application topically to the appropriate area as directed Every Night. (Patient not taking: Reported on 1/28/2025), Disp: 45 g, Rfl: 5         Review of Systems   Constitutional:  Negative for chills and diaphoresis.   HENT:  Negative for trouble swallowing and voice change.    Eyes:  Negative for visual disturbance.   Respiratory:  Negative for shortness of breath.    Cardiovascular:  Negative for chest pain and palpitations.   Gastrointestinal:  Negative for  "abdominal pain, nausea and vomiting.   Endocrine: Negative for polydipsia and polyphagia.   Genitourinary:  Positive for frequency and urgency. Negative for hematuria and hesitancy.   Musculoskeletal:  Negative for neck stiffness.   Skin:  Negative for color change and pallor.   Allergic/Immunologic: Negative for immunocompromised state.   Neurological:  Negative for seizures and syncope.   Hematological:  Negative for adenopathy.   Psychiatric/Behavioral:  Negative for hallucinations, sleep disturbance and suicidal ideas.        I have reviewed and confirmed the accuracy of the ROS as documented by the MA/LPN/RN Lebron Child MD      Objective   /72 (BP Location: Right arm, Patient Position: Sitting, Cuff Size: Adult)   Pulse 77   Temp 98 °F (36.7 °C) (Temporal)   Resp 18   Ht 166.4 cm (65.51\")   Wt 62.1 kg (137 lb)   SpO2 99%   BMI 22.44 kg/m²   BP Readings from Last 3 Encounters:   01/28/25 116/72   10/25/24 127/83   02/26/24 130/76     Wt Readings from Last 3 Encounters:   01/28/25 62.1 kg (137 lb)   10/25/24 61.3 kg (135 lb 3.2 oz)   02/26/24 61.7 kg (136 lb)     Physical Exam  Constitutional:       Appearance: Normal appearance. She is well-developed. She is not diaphoretic.   HENT:      Head: Normocephalic and atraumatic.      Right Ear: Tympanic membrane, ear canal and external ear normal.      Left Ear: Tympanic membrane, ear canal and external ear normal.      Nose: Nose normal.      Mouth/Throat:      Mouth: Mucous membranes are moist.   Eyes:      General: Lids are normal.      Extraocular Movements: Extraocular movements intact.      Conjunctiva/sclera: Conjunctivae normal.      Pupils: Pupils are equal, round, and reactive to light.   Neck:      Thyroid: No thyromegaly.      Vascular: No carotid bruit or JVD.      Trachea: No tracheal deviation.   Cardiovascular:      Rate and Rhythm: Normal rate and regular rhythm.      Heart sounds: Normal heart sounds. No murmur heard.     No friction " "rub. No gallop.   Pulmonary:      Effort: Pulmonary effort is normal.      Breath sounds: Normal breath sounds. No stridor. No decreased breath sounds, wheezing or rales.   Abdominal:      General: Bowel sounds are normal. There is no distension.      Palpations: Abdomen is soft. There is no mass.      Tenderness: There is no abdominal tenderness. There is no guarding or rebound.      Hernia: No hernia is present.   Lymphadenopathy:      Head:      Right side of head: No submental, submandibular, tonsillar, preauricular, posterior auricular or occipital adenopathy.      Left side of head: No submental, submandibular, tonsillar, preauricular, posterior auricular or occipital adenopathy.      Cervical: No cervical adenopathy.   Skin:     General: Skin is warm and dry.      Coloration: Skin is not pale.   Neurological:      Mental Status: She is alert and oriented to person, place, and time.      Cranial Nerves: No cranial nerve deficit.      Sensory: No sensory deficit.      Coordination: Coordination normal.      Gait: Gait normal.      Deep Tendon Reflexes: Reflexes are normal and symmetric.         Data / Lab Results:    No results found for: \"HGBA1C\"  Lab Results   Component Value Date    Glucose 86 05/18/2021    Glucose, UA Negative 01/28/2025     Lab Results   Component Value Date    LDL 98 05/18/2021     No results found for: \"CHOL\"  Lab Results   Component Value Date    TRIG 39 05/18/2021     Lab Results   Component Value Date    HDL 63 05/18/2021     No results found for: \"PSA\"  Lab Results   Component Value Date    WBC 5.5 05/18/2021    HGB 12.6 05/18/2021    HCT 37.3 05/18/2021    MCV 92 05/18/2021     05/18/2021     Lab Results   Component Value Date    TSH 0.690 05/18/2021      Lab Results   Component Value Date    GLUCOSE 86 05/18/2021    BUN 10 05/18/2021    CREATININE 0.66 05/18/2021    EGFRIFNONA 116 05/18/2021    EGFRIFAFRI 133 05/18/2021    BCR 15 05/18/2021    K 4.2 05/18/2021    CO2 24 " "05/18/2021    CALCIUM 9.4 05/18/2021    PROTENTOTREF 6.9 05/18/2021    ALBUMIN 4.4 05/18/2021    LABIL2 1.8 05/18/2021    AST 18 05/18/2021    ALT 13 05/18/2021     No results found for: \"PUMA\", \"RF\", \"SEDRATE\"   No results found for: \"CRP\"   No results found for: \"IRON\", \"TIBC\", \"FERRITIN\"   No results found for: \"UQFQWXOF50\"       Assessment & Plan      Medications        Problem List         LOS    UTI.  Initially improved on Macrobid, recurrent symptoms today, start ciprofloxacin.  Culture per urgent care with pan susceptible E. coli.  Increase fluid intake.  DDx includes nephrolithiasis.  Call return if fever or worsening or persistent symptoms.  Consider CT scan if worsening symptoms.  Health maintenance Doing well, vaccines current.  Discussed calcium and vitamin D.  Discussed baby aspirin daily.  Pap current/followed by OB/GYN.  Discussed health maintenance, screening tests, lifestyle modification.  Lumbar disc disease.  Moderate narrowing L5-S1 per x-ray 2021.  Improved currently on meloxicam.  Rehabilitation exercise discussed.  Consider MRI if worsening symptoms.  Anxiety.      Much improved today on Zoloft 25 mg daily Did not tolerate increased dose Lexapro, changed to Zoloft.  Continue as needed buspirone. longstanding. Sleep improved on as needed melatonin.  Good social support.  Follow-up recheck.  COVID-19 viral upper respiratory infection.  Improved/resolved currently has completed course antibiotics/prednisone.  Chest x-ray benign 3/20.  Wheezing.  Possibly post COVID versus asthma.  Start albuterol/daily inhaled steroid.  Check PFTs.  Follow-up recheck.  Call return if worsening symptoms.  Fear of flying.  Continue as needed buspirone, Rx for as needed Ativan written.  Acne/hypermelanosis.  Start as needed tretinoin, risk of birth defects in early pregnancy discussed, she is not planning any further pregnancies/has Mirena in place.        Diagnoses and all orders for this visit:    1. Acute UTI " (Primary)  -     POCT urinalysis dipstick, manual  -     Urine Culture - Urine, Urine, Random Void  -     ciprofloxacin (CIPRO) 500 MG tablet; Take 1 tablet by mouth 2 (Two) Times a Day.  Dispense: 20 tablet; Refill: 0    2. History of tobacco use              Expected course, medications, and adverse effects discussed.  Call or return if worsening or persistent symptoms.  I wore protective equipment throughout this patient encounter including a mask, gloves, and eye protection.  Hand hygiene was performed before donning protective equipment and after removal when leaving the room. The complete contents of the Assessment and Plan and Data/Lab Results as documented above have been reviewed and addressed by myself with the patient today as part of an ongoing evaluation / treatment plan.  If some of the documentation has been copied from a previous note and is unchanged it indicates that this problem / plan has been assessed today but is stable from a previous visit and no changes have been recommended.

## 2025-02-03 LAB
BACTERIA UR CULT: ABNORMAL
BACTERIA UR CULT: ABNORMAL
OTHER ANTIBIOTIC SUSC ISLT: ABNORMAL

## 2025-02-17 DIAGNOSIS — F41.9 ANXIETY: ICD-10-CM

## 2025-02-17 RX ORDER — SERTRALINE HYDROCHLORIDE 25 MG/1
TABLET, FILM COATED ORAL
Qty: 90 TABLET | Refills: 0 | Status: SHIPPED | OUTPATIENT
Start: 2025-02-17

## 2025-02-25 ENCOUNTER — PATIENT MESSAGE (OUTPATIENT)
Dept: FAMILY MEDICINE CLINIC | Facility: CLINIC | Age: 40
End: 2025-02-25

## 2025-02-25 ENCOUNTER — TELEPHONE (OUTPATIENT)
Dept: FAMILY MEDICINE CLINIC | Facility: CLINIC | Age: 40
End: 2025-02-25

## 2025-02-25 NOTE — TELEPHONE ENCOUNTER
Caller: Albina Alvarado    Relationship: Self    Best call back number: [phone] (Mobile)     What orders are you requesting (i.e. lab or imaging): CT ABDOMEN AND PELVIS     In what timeframe would the patient need to come in: ASAP     Where will you receive your lab/imaging services: ANYWHERE LOCAL WHO CAN GET HER IN FAST     Additional notes: PLEASE ADVISE.

## 2025-02-25 NOTE — TELEPHONE ENCOUNTER
I attempted to call patient with no answer. Left detail VM to call back to discuss and schedule an appointment.

## 2025-02-26 ENCOUNTER — TELEPHONE (OUTPATIENT)
Dept: FAMILY MEDICINE CLINIC | Facility: CLINIC | Age: 40
End: 2025-02-26

## 2025-02-26 NOTE — TELEPHONE ENCOUNTER
Let her know sorry to hear that she is not improving, could be another kidney stone, set up for a CT abdomen pelvis with without contrast renal stone protocol, could try open sided MRI if they have low cash pay prices, she should let me know if she gets a fever, unable to keep fluids down, worsening symptoms, thanks

## 2025-02-26 NOTE — TELEPHONE ENCOUNTER
Patient was seen on 1/28/2025 for UTI by Dr Child following Urgent Care visit on 1/16/2025 at [facility]. Urine Culture on 1/28/25 had result on Escherichia coli. She was prescribed Ciprofloxacin 500mg BID.     She called in on 2/25/2025 asking for CT Abdomen and Pelvis to be ordered. She is aware that she missed her follow up on 2/24/2025.

## 2025-03-11 DIAGNOSIS — F41.9 ANXIETY: ICD-10-CM

## 2025-03-11 RX ORDER — SERTRALINE HYDROCHLORIDE 25 MG/1
TABLET, FILM COATED ORAL
Qty: 90 TABLET | Refills: 0 | Status: SHIPPED | OUTPATIENT
Start: 2025-03-11

## 2025-06-01 DIAGNOSIS — M54.50 ACUTE BILATERAL LOW BACK PAIN WITHOUT SCIATICA: ICD-10-CM

## 2025-06-02 ENCOUNTER — TELEPHONE (OUTPATIENT)
Dept: FAMILY MEDICINE CLINIC | Facility: CLINIC | Age: 40
End: 2025-06-02

## 2025-06-02 DIAGNOSIS — M54.50 ACUTE BILATERAL LOW BACK PAIN WITHOUT SCIATICA: ICD-10-CM

## 2025-06-02 RX ORDER — MELOXICAM 15 MG/1
15 TABLET ORAL DAILY
Qty: 30 TABLET | Refills: 0 | Status: SHIPPED | OUTPATIENT
Start: 2025-06-02 | End: 2025-06-06 | Stop reason: SDUPTHER

## 2025-06-02 RX ORDER — MELOXICAM 15 MG/1
15 TABLET ORAL DAILY
Qty: 90 TABLET | Refills: 0 | OUTPATIENT
Start: 2025-06-02

## 2025-06-02 NOTE — TELEPHONE ENCOUNTER
Caller: Albina Alvarado    Relationship: Self    Best call back number: [phone]    What is the best time to reach you: ANYTIME     Who are you requesting to speak with (clinical staff, provider,  specific staff member): CLINICAL     What was the call regarding: PATIENT WOULD LIKE TO KNOW IF SHE CAN GET A SHORT SUPPLY OF HER MELOXICAM TO GET HER TO HER APPOINTMENT ON 6/6/25.     PLEASE ADVISE

## 2025-06-04 NOTE — PROGRESS NOTES
Subjective   Albina Alvarado is a 39 y.o. female.     Chief Complaint   Patient presents with    Anxiety    Back Pain       Anxiety  Presents for follow-up visit. Symptoms include nervous/anxious behavior. Patient reports no chest pain, depressed mood, dizziness, nausea, palpitations, shortness of breath or suicidal ideas. Decreased concentration:  improving on Zoloft.Symptoms occur occasionally. The severity of symptoms is mild. The patient sleeps 7 hours per night. The quality of sleep is fair. Nighttime awakenings: occasional.     Compliance with medications is % (Zoloft 25mg).   Back Pain  Chronicity:  Chronic  Onset:  More than 1 year ago  Frequency:  Every several days  Progression since onset:  Coming and going  Pain location:  Lumbar spine  Pain quality:  Aching, shooting and stabbing  Radiates to:  Does not radiate  Pain-numeric:  8/10  Pain severity:  Severe  Pain is:  The same all the time  Aggravated by:  Certain positions and twisting  Stiffness is present:  At night  Associated symptoms: no abdominal pain, no chest pain, no numbness, no pelvic pain, no tingling and no weakness    Treatments tried:  Home exercises and NSAIDs (stretching)  Improvement on treatment:  Mild           I personally reviewed and updated the patient's allergies, medications, problem list, and past medical, surgical, social, and family history. I have reviewed and confirmed the accuracy of the History of Present Illness and Review of Symptoms as documented by the MA/LUCIANON/RN. Lebron Child MD    Family History   Problem Relation Age of Onset    Hypertension Mother     COPD Mother     Ulcerative colitis Mother     Atrial fibrillation Father     No Known Problems Brother        Social History     Tobacco Use    Smoking status: Former     Current packs/day: 0.00     Average packs/day: 0.3 packs/day for 1 year (0.3 ttl pk-yrs)     Types: Cigarettes     Start date: 2004     Quit date: 2005     Years since quittin.4      Passive exposure: Past    Smokeless tobacco: Never   Vaping Use    Vaping status: Never Used   Substance Use Topics    Alcohol use: Yes     Alcohol/week: 4.0 standard drinks of alcohol     Types: 4 Drinks containing 0.5 oz of alcohol per week    Drug use: Not Currently       Past Surgical History:   Procedure Laterality Date    BREAST AUGMENTATION Bilateral     COLONOSCOPY W/ BIOPSIES  05/15/2009    Mild rectal hyerpemia, nonspecific, and a few small internal hermorrhoids otherwise normal. Biopsies taken.  Dr. Agnieszka Khan    HERNIA REPAIR Left 08/23/2011    inguinal; Dr. Hemalatha Wheat       Patient Active Problem List   Diagnosis    Acute bilateral low back pain without sciatica    Dysmenorrhea    History of tobacco use    COVID-19 virus detected    Anxiety    Annual visit for general adult medical examination with abnormal findings    Screening for malignant neoplasm of cervix    Influenza A    Acne vulgaris         Current Outpatient Medications:     meloxicam (MOBIC) 15 MG tablet, Take 1 tablet by mouth Daily As Needed for Moderate Pain., Disp: 90 tablet, Rfl: 3    sertraline (ZOLOFT) 25 MG tablet, Take 1 tablet by mouth Daily., Disp: 90 tablet, Rfl: 3         Review of Systems   Constitutional:  Negative for chills and diaphoresis.   HENT:  Negative for trouble swallowing and voice change.    Eyes:  Negative for visual disturbance.   Respiratory:  Negative for shortness of breath.    Cardiovascular:  Negative for chest pain and palpitations.   Gastrointestinal:  Negative for abdominal pain and nausea.   Endocrine: Negative for polydipsia and polyphagia.   Genitourinary:  Negative for hematuria and pelvic pain.   Musculoskeletal:  Positive for back pain. Negative for neck stiffness.   Skin:  Negative for color change and pallor.   Allergic/Immunologic: Negative for immunocompromised state.   Neurological:  Negative for dizziness, tingling, seizures, syncope, weakness and numbness.   Hematological:   "Negative for adenopathy.   Psychiatric/Behavioral:  Negative for hallucinations, sleep disturbance, suicidal ideas and depressed mood. Decreased concentration:  improving on Zoloft.The patient is nervous/anxious.        I have reviewed and confirmed the accuracy of the ROS as documented by the MA/LPN/RN Lebron Child MD      Objective   /68 (BP Location: Right arm, Patient Position: Sitting, Cuff Size: Adult)   Pulse 86   Temp 99.1 °F (37.3 °C) (Temporal)   Resp 18   Ht 165.1 cm (65\")   Wt 59.1 kg (130 lb 6.4 oz)   LMP 05/11/2025 (Exact Date)   SpO2 99%   BMI 21.70 kg/m²   BP Readings from Last 3 Encounters:   06/06/25 110/68   01/28/25 116/72   10/25/24 127/83     Wt Readings from Last 3 Encounters:   06/06/25 59.1 kg (130 lb 6.4 oz)   01/28/25 62.1 kg (137 lb)   10/25/24 61.3 kg (135 lb 3.2 oz)     Physical Exam  Constitutional:       Appearance: Normal appearance. She is well-developed. She is not diaphoretic.   HENT:      Head: Normocephalic and atraumatic.      Right Ear: Tympanic membrane, ear canal and external ear normal.      Left Ear: Tympanic membrane, ear canal and external ear normal.      Nose: Nose normal.      Mouth/Throat:      Mouth: Mucous membranes are moist.   Eyes:      General: Lids are normal.      Extraocular Movements: Extraocular movements intact.      Conjunctiva/sclera: Conjunctivae normal.      Pupils: Pupils are equal, round, and reactive to light.   Neck:      Thyroid: No thyromegaly.      Vascular: No carotid bruit or JVD.      Trachea: No tracheal deviation.   Cardiovascular:      Rate and Rhythm: Normal rate and regular rhythm.      Heart sounds: Normal heart sounds. No murmur heard.     No friction rub. No gallop.   Pulmonary:      Effort: Pulmonary effort is normal.      Breath sounds: Normal breath sounds. No stridor. No decreased breath sounds, wheezing or rales.   Abdominal:      General: Bowel sounds are normal. There is no distension.      Palpations: Abdomen " "is soft. There is no mass.      Tenderness: There is no abdominal tenderness. There is no guarding or rebound.      Hernia: No hernia is present.   Lymphadenopathy:      Head:      Right side of head: No submental, submandibular, tonsillar, preauricular, posterior auricular or occipital adenopathy.      Left side of head: No submental, submandibular, tonsillar, preauricular, posterior auricular or occipital adenopathy.      Cervical: No cervical adenopathy.   Skin:     General: Skin is warm and dry.      Coloration: Skin is not pale.   Neurological:      Mental Status: She is alert and oriented to person, place, and time.      Cranial Nerves: No cranial nerve deficit.      Sensory: No sensory deficit.      Coordination: Coordination normal.      Gait: Gait normal.      Deep Tendon Reflexes: Reflexes are normal and symmetric.         Data / Lab Results:    No results found for: \"HGBA1C\"  Lab Results   Component Value Date    Glucose 86 05/18/2021    Glucose, UA Negative 01/28/2025     Lab Results   Component Value Date    LDL 98 05/18/2021     No results found for: \"CHOL\"  Lab Results   Component Value Date    TRIG 39 05/18/2021     Lab Results   Component Value Date    HDL 63 05/18/2021     No results found for: \"PSA\"  Lab Results   Component Value Date    WBC 5.5 05/18/2021    HGB 12.6 05/18/2021    HCT 37.3 05/18/2021    MCV 92 05/18/2021     05/18/2021     Lab Results   Component Value Date    TSH 0.690 05/18/2021      Lab Results   Component Value Date    GLUCOSE 86 05/18/2021    BUN 10 05/18/2021    CREATININE 0.66 05/18/2021    EGFRIFNONA 116 05/18/2021    EGFRIFAFRI 133 05/18/2021    BCR 15 05/18/2021    K 4.2 05/18/2021    CO2 24 05/18/2021    CALCIUM 9.4 05/18/2021    ALBUMIN 4.4 05/18/2021    AST 18 05/18/2021    ALT 13 05/18/2021     No results found for: \"PUMA\", \"RF\", \"SEDRATE\"   No results found for: \"CRP\"   No results found for: \"IRON\", \"TIBC\", \"FERRITIN\"   No results found for: \"TPVRAVNT23\" "       Assessment & Plan      Medications        Problem List         LOS    UTI.  Imporved / resolved today, has completed course ciprofloxacin.  Culture per urgent care with pan susceptible E. coli.  Increase fluid intake.  DDx includes nephrolithiasis.  Call return if fever or worsening or persistent symptoms.  Consider CT scan if worsening symptoms.  Health maintenance Doing well, vaccines current.  Discussed calcium and vitamin D.  Discussed baby aspirin daily.  Pap current/followed by OB/GYN.  Discussed health maintenance, screening tests, lifestyle modification.  Lumbar disc disease.  Moderate narrowing L5-S1 per x-ray 2021.  Improved currently on meloxicam, alternate some days with tylenol.  Rehabilitation exercise discussed.  Consider MRI if worsening symptoms.  Joint pain.  Likely 2nd OA, add glucosamine chondrointen.  Multiple affected joints check rheum panel.  Anxiety.      Much improved today on Zoloft 25 mg daily Did not tolerate increased dose Lexapro, changed to Zoloft.  Continue as needed buspirone. longstanding. Sleep improved on as needed melatonin.  Good social support.  Follow-up recheck.  COVID-19 viral upper respiratory infection.  Improved/resolved currently has completed course antibiotics/prednisone.  Chest x-ray benign 3/20.  Wheezing.  Possibly post COVID versus asthma.  Start albuterol/daily inhaled steroid.  Check PFTs.  Follow-up recheck.  Call return if worsening symptoms.  Fear of flying.  Continue as needed buspirone, Rx for as needed Ativan written.  Acne/hypermelanosis.  Start as needed tretinoin, risk of birth defects in early pregnancy discussed, she is not planning any further pregnancies/has Mirena in place.         Diagnoses and all orders for this visit:    1. Acute bilateral low back pain without sciatica (Primary)  -     meloxicam (MOBIC) 15 MG tablet; Take 1 tablet by mouth Daily As Needed for Moderate Pain.  Dispense: 90 tablet; Refill: 3    2. Anxiety  -     sertraline  (ZOLOFT) 25 MG tablet; Take 1 tablet by mouth Daily.  Dispense: 90 tablet; Refill: 3    3. History of tobacco use              Expected course, medications, and adverse effects discussed.  Call or return if worsening or persistent symptoms.  I wore protective equipment throughout this patient encounter including a mask, gloves, and eye protection.  Hand hygiene was performed before donning protective equipment and after removal when leaving the room. The complete contents of the Assessment and Plan and Data/Lab Results as documented above have been reviewed and addressed by myself with the patient today as part of an ongoing evaluation / treatment plan.  If some of the documentation has been copied from a previous note and is unchanged it indicates that this problem / plan has been assessed today but is stable from a previous visit and no changes have been recommended.

## 2025-06-06 ENCOUNTER — OFFICE VISIT (OUTPATIENT)
Dept: FAMILY MEDICINE CLINIC | Facility: CLINIC | Age: 40
End: 2025-06-06

## 2025-06-06 VITALS
WEIGHT: 130.4 LBS | DIASTOLIC BLOOD PRESSURE: 68 MMHG | OXYGEN SATURATION: 99 % | BODY MASS INDEX: 21.73 KG/M2 | RESPIRATION RATE: 18 BRPM | HEART RATE: 86 BPM | TEMPERATURE: 99.1 F | HEIGHT: 65 IN | SYSTOLIC BLOOD PRESSURE: 110 MMHG

## 2025-06-06 DIAGNOSIS — Z00.01 ANNUAL VISIT FOR GENERAL ADULT MEDICAL EXAMINATION WITH ABNORMAL FINDINGS: Primary | ICD-10-CM

## 2025-06-06 DIAGNOSIS — R53.83 MALAISE AND FATIGUE: ICD-10-CM

## 2025-06-06 DIAGNOSIS — F41.9 ANXIETY: ICD-10-CM

## 2025-06-06 DIAGNOSIS — Z87.891 HISTORY OF TOBACCO USE: ICD-10-CM

## 2025-06-06 DIAGNOSIS — R53.81 MALAISE AND FATIGUE: ICD-10-CM

## 2025-06-06 DIAGNOSIS — M54.50 ACUTE BILATERAL LOW BACK PAIN WITHOUT SCIATICA: Primary | ICD-10-CM

## 2025-06-06 RX ORDER — SERTRALINE HYDROCHLORIDE 25 MG/1
25 TABLET, FILM COATED ORAL DAILY
Qty: 90 TABLET | Refills: 3 | Status: SHIPPED | OUTPATIENT
Start: 2025-06-06

## 2025-06-06 RX ORDER — MELOXICAM 15 MG/1
15 TABLET ORAL DAILY PRN
Qty: 90 TABLET | Refills: 3 | Status: SHIPPED | OUTPATIENT
Start: 2025-06-06